# Patient Record
Sex: FEMALE | Race: WHITE | Employment: OTHER | URBAN - METROPOLITAN AREA
[De-identification: names, ages, dates, MRNs, and addresses within clinical notes are randomized per-mention and may not be internally consistent; named-entity substitution may affect disease eponyms.]

---

## 2017-04-05 DIAGNOSIS — Z00.00 WELL ADULT EXAM: Primary | ICD-10-CM

## 2017-04-07 ENCOUNTER — HOSPITAL ENCOUNTER (OUTPATIENT)
Facility: CLINIC | Age: 72
Discharge: HOME | End: 2017-04-07
Payer: MEDICARE

## 2017-04-07 DIAGNOSIS — Z00.00 WELL ADULT EXAM: ICD-10-CM

## 2017-04-07 LAB
ABSOLUTE EOS #: 0.2 K/UL (ref 0–0.4)
ABSOLUTE LYMPH #: 1.9 K/UL (ref 1–4.8)
ABSOLUTE MONO #: 0.5 K/UL (ref 0.1–1.2)
ALBUMIN SERPL-MCNC: 4.1 G/DL (ref 3.5–5.2)
ALBUMIN/GLOBULIN RATIO: 1.4 (ref 1–2.5)
ALP BLD-CCNC: 66 U/L (ref 35–104)
ALT SERPL-CCNC: 19 U/L (ref 5–33)
ANION GAP SERPL CALCULATED.3IONS-SCNC: 11 MMOL/L (ref 9–17)
AST SERPL-CCNC: 22 U/L
BASOPHILS # BLD: 1 % (ref 0–2)
BASOPHILS ABSOLUTE: 0 K/UL (ref 0–0.2)
BILIRUB SERPL-MCNC: 0.57 MG/DL (ref 0.3–1.2)
BUN BLDV-MCNC: 15 MG/DL (ref 8–23)
BUN/CREAT BLD: ABNORMAL (ref 9–20)
CALCIUM SERPL-MCNC: 8.9 MG/DL (ref 8.6–10.4)
CHLORIDE BLD-SCNC: 105 MMOL/L (ref 98–107)
CHOLESTEROL/HDL RATIO: 3.5
CHOLESTEROL: 284 MG/DL
CO2: 27 MMOL/L (ref 20–31)
CREAT SERPL-MCNC: 0.57 MG/DL (ref 0.5–0.9)
DIFFERENTIAL TYPE: NORMAL
EOSINOPHILS RELATIVE PERCENT: 3 % (ref 1–4)
GFR AFRICAN AMERICAN: >60 ML/MIN
GFR NON-AFRICAN AMERICAN: >60 ML/MIN
GFR SERPL CREATININE-BSD FRML MDRD: ABNORMAL ML/MIN/{1.73_M2}
GFR SERPL CREATININE-BSD FRML MDRD: ABNORMAL ML/MIN/{1.73_M2}
GLUCOSE BLD-MCNC: 102 MG/DL (ref 70–99)
HCT VFR BLD CALC: 37.9 % (ref 36–46)
HDLC SERPL-MCNC: 81 MG/DL
HEMOGLOBIN: 12.7 G/DL (ref 12–16)
LDL CHOLESTEROL: 185 MG/DL (ref 0–130)
LYMPHOCYTES # BLD: 29 % (ref 24–44)
MCH RBC QN AUTO: 29.1 PG (ref 26–34)
MCHC RBC AUTO-ENTMCNC: 33.7 G/DL (ref 31–37)
MCV RBC AUTO: 86.4 FL (ref 80–100)
MONOCYTES # BLD: 8 % (ref 2–11)
PDW BLD-RTO: 15 % (ref 12.5–15.4)
PLATELET # BLD: 225 K/UL (ref 140–450)
PLATELET ESTIMATE: NORMAL
PMV BLD AUTO: 8.9 FL (ref 6–12)
POTASSIUM SERPL-SCNC: 4.1 MMOL/L (ref 3.7–5.3)
RBC # BLD: 4.38 M/UL (ref 4–5.2)
RBC # BLD: NORMAL 10*6/UL
SEG NEUTROPHILS: 59 % (ref 36–66)
SEGMENTED NEUTROPHILS ABSOLUTE COUNT: 4 K/UL (ref 1.8–7.7)
SODIUM BLD-SCNC: 143 MMOL/L (ref 135–144)
TOTAL PROTEIN: 7.1 G/DL (ref 6.4–8.3)
TRIGL SERPL-MCNC: 91 MG/DL
TSH SERPL DL<=0.05 MIU/L-ACNC: 1.33 MIU/L (ref 0.3–5)
VLDLC SERPL CALC-MCNC: ABNORMAL MG/DL (ref 1–30)
WBC # BLD: 6.7 K/UL (ref 3.5–11)
WBC # BLD: NORMAL 10*3/UL

## 2017-04-07 PROCEDURE — 36415 COLL VENOUS BLD VENIPUNCTURE: CPT

## 2017-04-07 PROCEDURE — 80061 LIPID PANEL: CPT

## 2017-04-07 PROCEDURE — 85025 COMPLETE CBC W/AUTO DIFF WBC: CPT

## 2017-04-07 PROCEDURE — 84443 ASSAY THYROID STIM HORMONE: CPT

## 2017-04-07 PROCEDURE — 80053 COMPREHEN METABOLIC PANEL: CPT

## 2017-04-12 ENCOUNTER — OFFICE VISIT (OUTPATIENT)
Dept: INTERNAL MEDICINE | Age: 72
End: 2017-04-12
Payer: MEDICARE

## 2017-04-12 VITALS
HEIGHT: 63 IN | WEIGHT: 150 LBS | SYSTOLIC BLOOD PRESSURE: 124 MMHG | DIASTOLIC BLOOD PRESSURE: 75 MMHG | HEART RATE: 79 BPM | BODY MASS INDEX: 26.58 KG/M2 | OXYGEN SATURATION: 100 %

## 2017-04-12 DIAGNOSIS — M79.10 MYALGIA: Primary | ICD-10-CM

## 2017-04-12 PROCEDURE — 99213 OFFICE O/P EST LOW 20 MIN: CPT | Performed by: INTERNAL MEDICINE

## 2017-04-12 RX ORDER — METHYLPREDNISOLONE 4 MG/1
TABLET ORAL
Qty: 1 KIT | Refills: 1 | Status: SHIPPED | OUTPATIENT
Start: 2017-04-12 | End: 2017-04-18

## 2017-04-12 RX ORDER — HYDROCODONE BITARTRATE AND ACETAMINOPHEN 5; 325 MG/1; MG/1
1 TABLET ORAL EVERY 8 HOURS PRN
Qty: 30 TABLET | Refills: 0 | Status: SHIPPED | OUTPATIENT
Start: 2017-04-12 | End: 2017-11-13 | Stop reason: SDUPTHER

## 2017-04-12 ASSESSMENT — PATIENT HEALTH QUESTIONNAIRE - PHQ9
SUM OF ALL RESPONSES TO PHQ9 QUESTIONS 1 & 2: 0
2. FEELING DOWN, DEPRESSED OR HOPELESS: 0
SUM OF ALL RESPONSES TO PHQ QUESTIONS 1-9: 0
1. LITTLE INTEREST OR PLEASURE IN DOING THINGS: 0

## 2017-06-06 ENCOUNTER — OFFICE VISIT (OUTPATIENT)
Dept: INTERNAL MEDICINE | Age: 72
End: 2017-06-06
Payer: MEDICARE

## 2017-06-06 VITALS
DIASTOLIC BLOOD PRESSURE: 74 MMHG | OXYGEN SATURATION: 99 % | WEIGHT: 147 LBS | HEIGHT: 63 IN | BODY MASS INDEX: 26.05 KG/M2 | HEART RATE: 97 BPM | SYSTOLIC BLOOD PRESSURE: 128 MMHG

## 2017-06-06 DIAGNOSIS — R05.9 PAIN AGGRAVATED BY COUGHING AND DEEP BREATHING: Primary | ICD-10-CM

## 2017-06-06 DIAGNOSIS — R07.81 RIB PAIN ON RIGHT SIDE: ICD-10-CM

## 2017-06-06 DIAGNOSIS — R52 PAIN AGGRAVATED BY COUGHING AND DEEP BREATHING: Primary | ICD-10-CM

## 2017-06-06 DIAGNOSIS — R07.9 CHEST PAIN, UNSPECIFIED TYPE: ICD-10-CM

## 2017-06-06 DIAGNOSIS — M79.10 MYALGIA: ICD-10-CM

## 2017-06-06 PROCEDURE — 93000 ELECTROCARDIOGRAM COMPLETE: CPT | Performed by: NURSE PRACTITIONER

## 2017-06-06 PROCEDURE — 99213 OFFICE O/P EST LOW 20 MIN: CPT | Performed by: NURSE PRACTITIONER

## 2017-06-06 RX ORDER — METHYLPREDNISOLONE 4 MG/1
TABLET ORAL
Qty: 1 KIT | Refills: 0 | Status: SHIPPED | OUTPATIENT
Start: 2017-06-06 | End: 2017-11-13

## 2017-09-19 ENCOUNTER — HOSPITAL ENCOUNTER (OUTPATIENT)
Age: 72
Discharge: HOME | End: 2017-09-19
Payer: MEDICARE

## 2017-10-10 ENCOUNTER — HOSPITAL ENCOUNTER (OUTPATIENT)
Age: 72
Discharge: HOME | End: 2017-10-10
Payer: MEDICARE

## 2017-11-13 ENCOUNTER — HOSPITAL ENCOUNTER (OUTPATIENT)
Age: 72
Discharge: HOME | End: 2017-11-13
Payer: MEDICARE

## 2017-11-13 ENCOUNTER — OFFICE VISIT (OUTPATIENT)
Dept: FAMILY MEDICINE CLINIC | Age: 72
End: 2017-11-13
Payer: MEDICARE

## 2017-11-13 ENCOUNTER — HOSPITAL ENCOUNTER (OUTPATIENT)
Dept: GENERAL RADIOLOGY | Facility: CLINIC | Age: 72
Discharge: HOME | End: 2017-11-13
Payer: MEDICARE

## 2017-11-13 VITALS
TEMPERATURE: 98 F | RESPIRATION RATE: 16 BRPM | WEIGHT: 152.8 LBS | SYSTOLIC BLOOD PRESSURE: 104 MMHG | HEIGHT: 63 IN | DIASTOLIC BLOOD PRESSURE: 56 MMHG | BODY MASS INDEX: 27.07 KG/M2 | HEART RATE: 80 BPM

## 2017-11-13 DIAGNOSIS — S90.32XA CONTUSION OF LEFT FOOT, INITIAL ENCOUNTER: ICD-10-CM

## 2017-11-13 DIAGNOSIS — M79.7 FIBROMYALGIA: Primary | ICD-10-CM

## 2017-11-13 DIAGNOSIS — Z85.3 HISTORY OF BREAST CANCER: ICD-10-CM

## 2017-11-13 DIAGNOSIS — M79.10 MYALGIA: ICD-10-CM

## 2017-11-13 DIAGNOSIS — E83.51 HYPOCALCEMIA: ICD-10-CM

## 2017-11-13 PROCEDURE — 99213 OFFICE O/P EST LOW 20 MIN: CPT | Performed by: INTERNAL MEDICINE

## 2017-11-13 PROCEDURE — 73620 X-RAY EXAM OF FOOT: CPT

## 2017-11-13 RX ORDER — HYDROCODONE BITARTRATE AND ACETAMINOPHEN 5; 325 MG/1; MG/1
1 TABLET ORAL EVERY 8 HOURS PRN
Qty: 30 TABLET | Refills: 0 | Status: SHIPPED | OUTPATIENT
Start: 2017-11-13 | End: 2017-11-20

## 2017-11-13 ASSESSMENT — ENCOUNTER SYMPTOMS
GASTROINTESTINAL NEGATIVE: 1
RESPIRATORY NEGATIVE: 1
EYES NEGATIVE: 1
ALLERGIC/IMMUNOLOGIC NEGATIVE: 1

## 2017-11-14 ENCOUNTER — TELEPHONE (OUTPATIENT)
Dept: FAMILY MEDICINE CLINIC | Age: 72
End: 2017-11-14

## 2017-11-14 NOTE — TELEPHONE ENCOUNTER
Patient called in requesting results of Xray. Please advise thank you! Next Visit Date:    Last ov 11/13/17    No future appointments.     Health Maintenance   Topic Date Due    Flu vaccine (1) 09/01/2017    Zostavax vaccine  01/18/2018 (Originally 11/7/2005)    DEXA (modify frequency per FRAX score)  01/18/2018 (Originally 11/7/2010)    DTaP/Tdap/Td vaccine (1 - Tdap) 01/18/2018 (Originally 4/28/2016)    Pneumococcal low/med risk (1 of 2 - PCV13) 01/18/2018 (Originally 11/7/2010)    Hepatitis C screen  01/18/2018 (Originally 1945)    Colon cancer screen colonoscopy  01/18/2018 (Originally 11/7/1995)    Lipid screen  04/07/2022       No results found for: LABA1C          ( goal A1C is < 7)   No results found for: LABMICR  LDL Cholesterol (mg/dL)   Date Value   04/07/2017 185 (H)       (goal LDL is <100)   AST (U/L)   Date Value   04/07/2017 22     ALT (U/L)   Date Value   04/07/2017 19     BUN (mg/dL)   Date Value   04/07/2017 15     BP Readings from Last 3 Encounters:   11/13/17 (!) 104/56   06/06/17 128/74   04/12/17 124/75          (goal 120/80)    All Future Testing planned in [facility]  Lab Frequency Next Occurrence   ENRIQUE Digital Screen Bilateral Once 11/21/2017   Magnesium Once 11/23/2016               Patient Active Problem List:     Myalgia     Constipation     Malaise     History of breast cancer     Hypocalcemia

## 2018-03-07 ENCOUNTER — HOSPITAL ENCOUNTER (OUTPATIENT)
Dept: GENERAL RADIOLOGY | Facility: CLINIC | Age: 73
Discharge: HOME | End: 2018-03-09
Payer: MEDICARE

## 2018-03-07 DIAGNOSIS — M25.551 RIGHT HIP PAIN: ICD-10-CM

## 2018-03-07 PROCEDURE — 73502 X-RAY EXAM HIP UNI 2-3 VIEWS: CPT

## 2018-06-11 ENCOUNTER — HOSPITAL ENCOUNTER (OUTPATIENT)
Facility: CLINIC | Age: 73
Discharge: HOME | End: 2018-06-11
Payer: MEDICARE

## 2018-06-11 DIAGNOSIS — Z11.59 ENCOUNTER FOR HEPATITIS C SCREENING TEST FOR LOW RISK PATIENT: ICD-10-CM

## 2018-06-11 DIAGNOSIS — Z13.1 SCREENING FOR DIABETES MELLITUS: ICD-10-CM

## 2018-06-11 DIAGNOSIS — Z13.220 SCREENING FOR LIPID DISORDERS: ICD-10-CM

## 2018-06-11 LAB
CHOLESTEROL/HDL RATIO: 3.1
CHOLESTEROL: 243 MG/DL
GLUCOSE FASTING: 91 MG/DL (ref 70–99)
HDLC SERPL-MCNC: 78 MG/DL
HEPATITIS C ANTIBODY: NONREACTIVE
LDL CHOLESTEROL: 149 MG/DL (ref 0–130)
TRIGL SERPL-MCNC: 82 MG/DL
VLDLC SERPL CALC-MCNC: ABNORMAL MG/DL (ref 1–30)

## 2018-06-11 PROCEDURE — 36415 COLL VENOUS BLD VENIPUNCTURE: CPT

## 2018-06-11 PROCEDURE — 80061 LIPID PANEL: CPT

## 2018-06-11 PROCEDURE — 86803 HEPATITIS C AB TEST: CPT

## 2018-06-11 PROCEDURE — 82947 ASSAY GLUCOSE BLOOD QUANT: CPT

## 2018-09-14 ENCOUNTER — HOSPITAL ENCOUNTER (OUTPATIENT)
Age: 73
Setting detail: SPECIMEN
Discharge: HOME | End: 2018-09-14
Payer: MEDICARE

## 2018-09-14 DIAGNOSIS — L65.9 HAIR LOSS: ICD-10-CM

## 2018-09-14 LAB
ABSOLUTE EOS #: 0.17 K/UL (ref 0–0.44)
ABSOLUTE IMMATURE GRANULOCYTE: <0.03 K/UL (ref 0–0.3)
ABSOLUTE LYMPH #: 1.58 K/UL (ref 1.1–3.7)
ABSOLUTE MONO #: 0.43 K/UL (ref 0.1–1.2)
BASOPHILS # BLD: 1 % (ref 0–2)
BASOPHILS ABSOLUTE: 0.04 K/UL (ref 0–0.2)
DIFFERENTIAL TYPE: NORMAL
EOSINOPHILS RELATIVE PERCENT: 3 % (ref 1–4)
HCT VFR BLD CALC: 41.8 % (ref 36.3–47.1)
HEMOGLOBIN: 13.3 G/DL (ref 11.9–15.1)
IMMATURE GRANULOCYTES: 0 %
LYMPHOCYTES # BLD: 28 % (ref 24–43)
MCH RBC QN AUTO: 30.2 PG (ref 25.2–33.5)
MCHC RBC AUTO-ENTMCNC: 31.8 G/DL (ref 28.4–34.8)
MCV RBC AUTO: 95 FL (ref 82.6–102.9)
MONOCYTES # BLD: 8 % (ref 3–12)
NRBC AUTOMATED: 0 PER 100 WBC
PDW BLD-RTO: 13.5 % (ref 11.8–14.4)
PLATELET # BLD: 215 K/UL (ref 138–453)
PLATELET ESTIMATE: NORMAL
PMV BLD AUTO: 11.6 FL (ref 8.1–13.5)
RBC # BLD: 4.4 M/UL (ref 3.95–5.11)
RBC # BLD: NORMAL 10*6/UL
SEG NEUTROPHILS: 60 % (ref 36–65)
SEGMENTED NEUTROPHILS ABSOLUTE COUNT: 3.4 K/UL (ref 1.5–8.1)
TSH SERPL DL<=0.05 MIU/L-ACNC: 0.76 MIU/L (ref 0.3–5)
VITAMIN B-12: 274 PG/ML (ref 232–1245)
WBC # BLD: 5.6 K/UL (ref 3.5–11.3)
WBC # BLD: NORMAL 10*3/UL

## 2019-03-24 ENCOUNTER — APPOINTMENT (OUTPATIENT)
Dept: CT IMAGING | Facility: CLINIC | Age: 74
End: 2019-03-24
Payer: MEDICARE

## 2019-03-24 ENCOUNTER — HOSPITAL ENCOUNTER (EMERGENCY)
Facility: CLINIC | Age: 74
Discharge: HOME | End: 2019-03-24
Attending: EMERGENCY MEDICINE
Payer: MEDICARE

## 2019-03-24 VITALS
OXYGEN SATURATION: 99 % | SYSTOLIC BLOOD PRESSURE: 126 MMHG | BODY MASS INDEX: 23.22 KG/M2 | DIASTOLIC BLOOD PRESSURE: 75 MMHG | HEIGHT: 64 IN | HEART RATE: 79 BPM | TEMPERATURE: 98 F | RESPIRATION RATE: 14 BRPM | WEIGHT: 136 LBS

## 2019-03-24 DIAGNOSIS — N39.0 URINARY TRACT INFECTION WITH HEMATURIA, SITE UNSPECIFIED: ICD-10-CM

## 2019-03-24 DIAGNOSIS — R31.9 URINARY TRACT INFECTION WITH HEMATURIA, SITE UNSPECIFIED: ICD-10-CM

## 2019-03-24 DIAGNOSIS — N20.1 LEFT URETERAL STONE: Primary | ICD-10-CM

## 2019-03-24 LAB
-: ABNORMAL
ABSOLUTE EOS #: 0.1 K/UL (ref 0–0.4)
ABSOLUTE IMMATURE GRANULOCYTE: ABNORMAL K/UL (ref 0–0.3)
ABSOLUTE LYMPH #: 1.9 K/UL (ref 1–4.8)
ABSOLUTE MONO #: 0.5 K/UL (ref 0.1–1.2)
ALBUMIN SERPL-MCNC: 4.4 G/DL (ref 3.5–5.2)
ALBUMIN/GLOBULIN RATIO: 1.4 (ref 1–2.5)
ALP BLD-CCNC: 71 U/L (ref 35–104)
ALT SERPL-CCNC: 10 U/L (ref 5–33)
AMORPHOUS: ABNORMAL
ANION GAP SERPL CALCULATED.3IONS-SCNC: 15 MMOL/L (ref 9–17)
AST SERPL-CCNC: 20 U/L
BACTERIA: ABNORMAL
BASOPHILS # BLD: 0 % (ref 0–2)
BASOPHILS ABSOLUTE: 0 K/UL (ref 0–0.2)
BILIRUB SERPL-MCNC: 0.5 MG/DL (ref 0.3–1.2)
BILIRUBIN URINE: NEGATIVE
BUN BLDV-MCNC: 22 MG/DL (ref 8–23)
BUN/CREAT BLD: ABNORMAL (ref 9–20)
CALCIUM SERPL-MCNC: 9.9 MG/DL (ref 8.6–10.4)
CASTS UA: ABNORMAL /LPF (ref 0–2)
CHLORIDE BLD-SCNC: 103 MMOL/L (ref 98–107)
CO2: 24 MMOL/L (ref 20–31)
COLOR: YELLOW
COMMENT UA: ABNORMAL
CREAT SERPL-MCNC: 0.9 MG/DL (ref 0.5–0.9)
CRYSTALS, UA: ABNORMAL /HPF
DIFFERENTIAL TYPE: ABNORMAL
EOSINOPHILS RELATIVE PERCENT: 2 % (ref 1–4)
EPITHELIAL CELLS UA: ABNORMAL /HPF (ref 0–5)
GFR AFRICAN AMERICAN: >60 ML/MIN
GFR NON-AFRICAN AMERICAN: >60 ML/MIN
GFR SERPL CREATININE-BSD FRML MDRD: ABNORMAL ML/MIN/{1.73_M2}
GFR SERPL CREATININE-BSD FRML MDRD: ABNORMAL ML/MIN/{1.73_M2}
GLUCOSE BLD-MCNC: 101 MG/DL (ref 70–99)
GLUCOSE URINE: NEGATIVE
HCT VFR BLD CALC: 43 % (ref 36–46)
HEMOGLOBIN: 14.2 G/DL (ref 12–16)
IMMATURE GRANULOCYTES: ABNORMAL %
KETONES, URINE: ABNORMAL
LEUKOCYTE ESTERASE, URINE: ABNORMAL
LYMPHOCYTES # BLD: 20 % (ref 24–44)
MCH RBC QN AUTO: 30.3 PG (ref 26–34)
MCHC RBC AUTO-ENTMCNC: 33 G/DL (ref 31–37)
MCV RBC AUTO: 91.8 FL (ref 80–100)
MONOCYTES # BLD: 6 % (ref 2–11)
MUCUS: ABNORMAL
NITRITE, URINE: NEGATIVE
NRBC AUTOMATED: ABNORMAL PER 100 WBC
OTHER OBSERVATIONS UA: ABNORMAL
PDW BLD-RTO: 14.5 % (ref 12.5–15.4)
PH UA: 5.5 (ref 5–8)
PLATELET # BLD: 207 K/UL (ref 140–450)
PLATELET ESTIMATE: ABNORMAL
PMV BLD AUTO: 8.7 FL (ref 6–12)
POTASSIUM SERPL-SCNC: 4.1 MMOL/L (ref 3.7–5.3)
PROTEIN UA: NEGATIVE
RBC # BLD: 4.69 M/UL (ref 4–5.2)
RBC # BLD: ABNORMAL 10*6/UL
RBC UA: ABNORMAL /HPF (ref 0–2)
RENAL EPITHELIAL, UA: ABNORMAL /HPF
SEG NEUTROPHILS: 72 % (ref 36–66)
SEGMENTED NEUTROPHILS ABSOLUTE COUNT: 6.8 K/UL (ref 1.8–7.7)
SODIUM BLD-SCNC: 142 MMOL/L (ref 135–144)
SPECIFIC GRAVITY UA: 1.02 (ref 1–1.03)
TOTAL PROTEIN: 7.5 G/DL (ref 6.4–8.3)
TRICHOMONAS: ABNORMAL
TURBIDITY: ABNORMAL
URINE HGB: ABNORMAL
UROBILINOGEN, URINE: NORMAL
WBC # BLD: 9.4 K/UL (ref 3.5–11)
WBC # BLD: ABNORMAL 10*3/UL
WBC UA: ABNORMAL /HPF (ref 0–5)
YEAST: ABNORMAL

## 2019-03-24 PROCEDURE — 87086 URINE CULTURE/COLONY COUNT: CPT

## 2019-03-24 PROCEDURE — 96375 TX/PRO/DX INJ NEW DRUG ADDON: CPT

## 2019-03-24 PROCEDURE — 36415 COLL VENOUS BLD VENIPUNCTURE: CPT

## 2019-03-24 PROCEDURE — 80053 COMPREHEN METABOLIC PANEL: CPT

## 2019-03-24 PROCEDURE — 74176 CT ABD & PELVIS W/O CONTRAST: CPT

## 2019-03-24 PROCEDURE — 6360000002 HC RX W HCPCS: Performed by: EMERGENCY MEDICINE

## 2019-03-24 PROCEDURE — 81001 URINALYSIS AUTO W/SCOPE: CPT

## 2019-03-24 PROCEDURE — 2580000003 HC RX 258: Performed by: EMERGENCY MEDICINE

## 2019-03-24 PROCEDURE — 96374 THER/PROPH/DIAG INJ IV PUSH: CPT

## 2019-03-24 PROCEDURE — 85025 COMPLETE CBC W/AUTO DIFF WBC: CPT

## 2019-03-24 PROCEDURE — 99284 EMERGENCY DEPT VISIT MOD MDM: CPT

## 2019-03-24 RX ORDER — CEPHALEXIN 500 MG/1
500 CAPSULE ORAL 4 TIMES DAILY
Qty: 28 CAPSULE | Refills: 0 | Status: SHIPPED | OUTPATIENT
Start: 2019-03-24 | End: 2019-03-31

## 2019-03-24 RX ORDER — KETOROLAC TROMETHAMINE 15 MG/ML
15 INJECTION, SOLUTION INTRAMUSCULAR; INTRAVENOUS ONCE
Status: COMPLETED | OUTPATIENT
Start: 2019-03-24 | End: 2019-03-24

## 2019-03-24 RX ORDER — IBUPROFEN 600 MG/1
600 TABLET ORAL EVERY 6 HOURS PRN
Qty: 30 TABLET | Refills: 0 | Status: SHIPPED | OUTPATIENT
Start: 2019-03-24

## 2019-03-24 RX ORDER — ONDANSETRON 4 MG/1
4 TABLET, ORALLY DISINTEGRATING ORAL EVERY 8 HOURS PRN
Qty: 20 TABLET | Refills: 0 | Status: SHIPPED | OUTPATIENT
Start: 2019-03-24 | End: 2020-09-04

## 2019-03-24 RX ORDER — ONDANSETRON 2 MG/ML
4 INJECTION INTRAMUSCULAR; INTRAVENOUS ONCE
Status: COMPLETED | OUTPATIENT
Start: 2019-03-24 | End: 2019-03-24

## 2019-03-24 RX ORDER — HYDROCODONE BITARTRATE AND ACETAMINOPHEN 5; 325 MG/1; MG/1
1 TABLET ORAL EVERY 6 HOURS PRN
Qty: 20 TABLET | Refills: 0 | Status: SHIPPED | OUTPATIENT
Start: 2019-03-24 | End: 2021-06-02 | Stop reason: SDUPTHER

## 2019-03-24 RX ORDER — 0.9 % SODIUM CHLORIDE 0.9 %
500 INTRAVENOUS SOLUTION INTRAVENOUS ONCE
Status: COMPLETED | OUTPATIENT
Start: 2019-03-24 | End: 2019-03-24

## 2019-03-24 RX ADMIN — ONDANSETRON 4 MG: 2 INJECTION INTRAMUSCULAR; INTRAVENOUS at 16:02

## 2019-03-24 RX ADMIN — SODIUM CHLORIDE 500 ML: 9 INJECTION, SOLUTION INTRAVENOUS at 16:02

## 2019-03-24 RX ADMIN — KETOROLAC TROMETHAMINE 15 MG: 15 INJECTION, SOLUTION INTRAMUSCULAR; INTRAVENOUS at 16:06

## 2019-03-24 ASSESSMENT — PAIN SCALES - GENERAL
PAINLEVEL_OUTOF10: 10
PAINLEVEL_OUTOF10: 10
PAINLEVEL_OUTOF10: 3
PAINLEVEL_OUTOF10: 7
PAINLEVEL_OUTOF10: 2

## 2019-03-24 ASSESSMENT — PAIN DESCRIPTION - FREQUENCY: FREQUENCY: INTERMITTENT

## 2019-03-24 ASSESSMENT — PAIN DESCRIPTION - DESCRIPTORS: DESCRIPTORS: SHARP

## 2019-03-24 ASSESSMENT — PAIN DESCRIPTION - ORIENTATION
ORIENTATION: LEFT;LOWER
ORIENTATION: LEFT
ORIENTATION: LEFT;LOWER

## 2019-03-24 ASSESSMENT — PAIN DESCRIPTION - PAIN TYPE
TYPE: ACUTE PAIN

## 2019-03-24 ASSESSMENT — PAIN DESCRIPTION - LOCATION
LOCATION: ABDOMEN
LOCATION: ABDOMEN;PELVIS
LOCATION: ABDOMEN;PELVIS

## 2019-03-24 ASSESSMENT — PAIN - FUNCTIONAL ASSESSMENT: PAIN_FUNCTIONAL_ASSESSMENT: 0-10

## 2019-03-25 LAB
CULTURE: NORMAL
Lab: NORMAL
SPECIMEN DESCRIPTION: NORMAL

## 2020-08-28 ENCOUNTER — HOSPITAL ENCOUNTER (OUTPATIENT)
Age: 75
Setting detail: SPECIMEN
Discharge: HOME | End: 2020-08-28
Payer: MEDICARE

## 2020-08-28 LAB
ALBUMIN SERPL-MCNC: 4.3 G/DL (ref 3.5–5.2)
ALBUMIN/GLOBULIN RATIO: 1.5 (ref 1–2.5)
ALP BLD-CCNC: 60 U/L (ref 35–104)
ALT SERPL-CCNC: 12 U/L (ref 5–33)
ANION GAP SERPL CALCULATED.3IONS-SCNC: 14 MMOL/L (ref 9–17)
AST SERPL-CCNC: 22 U/L
BILIRUB SERPL-MCNC: 0.51 MG/DL (ref 0.3–1.2)
BUN BLDV-MCNC: 26 MG/DL (ref 8–23)
BUN/CREAT BLD: ABNORMAL (ref 9–20)
CALCIUM SERPL-MCNC: 9.8 MG/DL (ref 8.6–10.4)
CHLORIDE BLD-SCNC: 103 MMOL/L (ref 98–107)
CHOLESTEROL/HDL RATIO: 3.8
CHOLESTEROL: 252 MG/DL
CO2: 23 MMOL/L (ref 20–31)
CREAT SERPL-MCNC: 0.78 MG/DL (ref 0.5–0.9)
GFR AFRICAN AMERICAN: >60 ML/MIN
GFR NON-AFRICAN AMERICAN: >60 ML/MIN
GFR SERPL CREATININE-BSD FRML MDRD: ABNORMAL ML/MIN/{1.73_M2}
GFR SERPL CREATININE-BSD FRML MDRD: ABNORMAL ML/MIN/{1.73_M2}
GLUCOSE FASTING: 92 MG/DL (ref 70–99)
HDLC SERPL-MCNC: 67 MG/DL
LDL CHOLESTEROL: 165 MG/DL (ref 0–130)
POTASSIUM SERPL-SCNC: 4.3 MMOL/L (ref 3.7–5.3)
SODIUM BLD-SCNC: 140 MMOL/L (ref 135–144)
TOTAL PROTEIN: 7.1 G/DL (ref 6.4–8.3)
TRIGL SERPL-MCNC: 100 MG/DL
VLDLC SERPL CALC-MCNC: ABNORMAL MG/DL (ref 1–30)

## 2020-10-08 PROBLEM — N20.2 CALCULUS OF KIDNEY AND URETER: Status: ACTIVE | Noted: 2019-03-27

## 2021-10-03 ENCOUNTER — HOSPITAL ENCOUNTER (EMERGENCY)
Facility: CLINIC | Age: 76
Discharge: HOME | End: 2021-10-03
Attending: EMERGENCY MEDICINE
Payer: MEDICARE

## 2021-10-03 VITALS
OXYGEN SATURATION: 98 % | SYSTOLIC BLOOD PRESSURE: 147 MMHG | DIASTOLIC BLOOD PRESSURE: 70 MMHG | HEART RATE: 66 BPM | RESPIRATION RATE: 18 BRPM

## 2021-10-03 DIAGNOSIS — M54.32 SCIATICA OF LEFT SIDE: Primary | ICD-10-CM

## 2021-10-03 PROCEDURE — 99282 EMERGENCY DEPT VISIT SF MDM: CPT

## 2021-10-03 RX ORDER — CYCLOBENZAPRINE HCL 5 MG
5 TABLET ORAL 2 TIMES DAILY PRN
Qty: 10 TABLET | Refills: 0 | Status: SHIPPED | OUTPATIENT
Start: 2021-10-03 | End: 2021-10-08 | Stop reason: SDUPTHER

## 2021-10-03 RX ORDER — TRAMADOL HYDROCHLORIDE 50 MG/1
50 TABLET ORAL EVERY 4 HOURS PRN
Qty: 30 TABLET | Refills: 0 | Status: SHIPPED | OUTPATIENT
Start: 2021-10-03 | End: 2021-10-08

## 2021-10-03 ASSESSMENT — PAIN SCALES - GENERAL: PAINLEVEL_OUTOF10: 7

## 2021-10-03 NOTE — ED PROVIDER NOTES
[facility]  Phone: [address]      Pt Name: Claudeen Ares  MRN: [id number]  [place] 1945  Date of evaluation: 10/3/2021    CHIEF COMPLAINT       Chief Complaint   Patient presents with    Back Pain     lower back pain/spasms, tried norco at home with no relief, pt notes she cannot have steriods per PCP       HISTORY OF Abbie is a 76 y.o. female who presents shooting pain in her buttocks. Patient states that she has never had this pain before she states that her back is a concern for her and has been a problem in addition to her hips bilaterally. She denies any trauma. She is notes no incontinence. She noted no rash. She states that she did have a area where she was using a heating pad believes she has some areas of slight blistering noted in the buttock region on the left side    REVIEW OF SYSTEMS     Constitutional: No fevers or chills   HEENT: No sore throat, rhinorrhea, or earache   Eyes: No blurry vision or double vision no drainage   Cardiovascular: No chest pain or tachycardia   Respiratory: No wheezing or shortness of breath no cough   Gastrointestinal: No nausea, vomiting, diarrhea, constipation, or abdominal pain   : No hematuria or dysuria   Musculoskeletal: No swelling or pain   Skin: No rash   Neurological: No focal neurologic complaints, weakness, or headache   see above  PAST MEDICAL HISTORY    has a past medical history of Calculus of kidney and ureter, Cancer of breast ([facility] [address] Fibromyalgia, and GERD (gastroesophageal reflux disease). SURGICAL HISTORY      has a past surgical history that includes Mastectomy (Bilateral, 11/2011); Cholecystectomy; [place] tooth extraction; Appendectomy; Tonsillectomy; Cataract removal; china and bso (cervix removed); cystourethroscopy (N/A, 05/08/2019); and Ureter stent placement (Left, 05/09/2019).     CURRENT MEDICATIONS       Previous Medications IBUPROFEN (ADVIL;MOTRIN) 600 MG TABLET    Take 1 tablet by mouth every 6 hours as needed for Pain       ALLERGIES     is allergic to other, percodan [oxycodone-aspirin], and aspirin. FAMILY HISTORY     has no family status information on file. family history is not on file. SOCIAL HISTORY      reports that she quit smoking about 53 years ago. Her smoking use included cigarettes. She started smoking about 61 years ago. She has a 4.00 pack-year smoking history. She has never used smokeless tobacco. She reports current alcohol use. She reports that she does not use drugs. PHYSICAL EXAM       ED Triage Vitals [10/03/21 1203]   BP Temp Temp src Pulse Resp SpO2 Height Weight   (!) 147/70 -- -- 66 18 98 % -- --     Constitutional: Alert, oriented x3, nontoxic, answering questions appropriately, acting properly for age, in no acute distress   HEENT: Extraocular muscles intact, mucous membranes moist.  Neck: Trachea midline, supple without lymphadenopathy, no posterior midline neck tenderness to palpation   Musculoskeletal: No extremity pain or swelling exam of the buttock area reveals some tenderness on palpation of overlying the sciatic nerve distribution and in the piriformis region. There is pain with forward flexion and slight pain with extension. There is noted to be a area of erythema with some blistering but I believe this is secondary to the heating pad that she used. Neurologic: Moving all 4 extremities without difficulty   Skin: Warm and dry     DIFFERENTIAL DIAGNOSIS/ MDM:     Sciatica do not believe that the patient has shingles on her history. She is limited the amount of medications that she can use she states that she cannot take any anti-inflammatory medicines and is not wanting to take any steroids at this time.   Patient will get some Flexeril she has some Norco at home and I will prescribe her some Ultram she is to follow-up with her orthopedic doctor soon as possible if not improving. DIAGNOSTIC RESULTS     EKG: All EKG's are interpreted by the Emergency Department Physician who either signs or Co-signs this chart in the absence of a cardiologist.        Not indicated unless otherwise documented above    LABS:  No results found for this visit on 10/03/21. Not indicated unless otherwise documented above    RADIOLOGY:   I reviewed the radiologist interpretations:    No orders to display       Not indicated unless otherwise documented above    EMERGENCY DEPARTMENT COURSE:     The patient was given the following medications:  Orders Placed This Encounter   Medications    cyclobenzaprine (FLEXERIL) 5 MG tablet     Sig: Take 1 tablet by mouth 2 times daily as needed for Muscle spasms     Dispense:  10 tablet     Refill:  0    traMADol (ULTRAM) 50 MG tablet     Sig: Take 1 tablet by mouth every 4 hours as needed for Pain for up to 5 days. Intended supply: 5 days. Take lowest dose possible to manage pain     Dispense:  30 tablet     Refill:  0        Vitals:   -------------------------  BP (!) 147/70   Pulse 66   Resp 18   SpO2 98%         I have reviewed the disposition diagnosis with the patient and or their family/guardian. I have answered their questions and given discharge instructions. They voiced understanding of these instructions and did not have any furtherquestions or complaints. CRITICAL CARE:    None    CONSULTS:    None    PROCEDURES:    None      OARRS Report if indicated             FINAL IMPRESSION      1. Sciatica of left side          DISPOSITION/PLAN   DISPOSITION Decision To Discharge 10/03/2021 12:10:02 PM        CONDITION ON DISPOSITION: STABLE       PATIENT REFERRED TO:  No follow-up provider specified.     DISCHARGE MEDICATIONS:  New Prescriptions    CYCLOBENZAPRINE (FLEXERIL) 5 MG TABLET    Take 1 tablet by mouth 2 times daily as needed for Muscle spasms    TRAMADOL (ULTRAM) 50 MG TABLET    Take 1 tablet by mouth every 4 hours as needed for Pain for up to 5 days. Intended supply: 5 days.  Take lowest dose possible to manage pain       (Please note that portions of thisnote were completed with a voice recognition program.  Efforts were made to edit the dictations but occasionally words are mis-transcribed.)    Jluio Dueñas MD,, MD  Attending Emergency Physician        Julio Dueñas MD  10/03/21 [id number]

## 2022-07-31 ENCOUNTER — APPOINTMENT (OUTPATIENT)
Dept: GENERAL RADIOLOGY | Facility: CLINIC | Age: 77
End: 2022-07-31
Payer: MEDICARE

## 2022-07-31 ENCOUNTER — HOSPITAL ENCOUNTER (EMERGENCY)
Facility: CLINIC | Age: 77
Discharge: HOME | End: 2022-07-31
Attending: EMERGENCY MEDICINE
Payer: MEDICARE

## 2022-07-31 VITALS
HEIGHT: 63 IN | SYSTOLIC BLOOD PRESSURE: 132 MMHG | OXYGEN SATURATION: 99 % | RESPIRATION RATE: 16 BRPM | BODY MASS INDEX: 24.8 KG/M2 | WEIGHT: 140 LBS | DIASTOLIC BLOOD PRESSURE: 58 MMHG | TEMPERATURE: 98.2 F | HEART RATE: 85 BPM

## 2022-07-31 DIAGNOSIS — S93.602A SPRAIN OF LEFT FOOT, INITIAL ENCOUNTER: Primary | ICD-10-CM

## 2022-07-31 PROCEDURE — 99283 EMERGENCY DEPT VISIT LOW MDM: CPT

## 2022-07-31 PROCEDURE — 73630 X-RAY EXAM OF FOOT: CPT

## 2022-07-31 RX ORDER — ACETAMINOPHEN 325 MG/1
650 TABLET ORAL EVERY 6 HOURS PRN
COMMUNITY

## 2022-07-31 ASSESSMENT — PAIN SCALES - GENERAL: PAINLEVEL_OUTOF10: 2

## 2022-07-31 ASSESSMENT — LIFESTYLE VARIABLES
HOW OFTEN DO YOU HAVE A DRINK CONTAINING ALCOHOL: MONTHLY OR LESS
HOW MANY STANDARD DRINKS CONTAINING ALCOHOL DO YOU HAVE ON A TYPICAL DAY: 1 OR 2

## 2022-07-31 ASSESSMENT — PAIN - FUNCTIONAL ASSESSMENT: PAIN_FUNCTIONAL_ASSESSMENT: 0-10

## 2022-07-31 NOTE — ED PROVIDER NOTES
[facility]  [address] [facility]  Phone: [address]      Pt Name: Teresa Harden  MRN: [id number]  [place] 1945  Date of evaluation: 7/31/2022    CHIEF COMPLAINT       Chief Complaint   Patient presents with    [address] is a 68 y.o. female who presents to the emergency [facility] complaining of left foot pain. Yesterday she was sitting doing some paperwork when she stood up her foot had fallen asleep and she fell the left side. She denies any other injuries no head injury or loss of consciousness no neck pain. She is only complaining of left foot pain worse with movement. She denies any other complaints no paresthesias or weakness. She rates her pain 2 out of 10. Does not want anything for pain at this time. REVIEW OF SYSTEMS       Constitutional: No fevers or chills   Musculoskeletal: Positive left foot pain  Skin: No rash left lower extremity  Neurological: No paresthesias or weakness left lower extremity    PAST MEDICAL HISTORY    has a past medical history of Calculus of kidney and ureter, Cancer of breast ([address] Fibromyalgia, and GERD (gastroesophageal reflux disease). SURGICAL HISTORY      has a past surgical history that includes Mastectomy (Bilateral, 11/2011); Cholecystectomy; [place] tooth extraction; Appendectomy; Tonsillectomy; Cataract removal; Total abdominal hysterectomy w/ bilateral salpingoophorectomy; cystourethroscopy (N/A, 05/08/2019); and Ureter stent placement (Left, 05/09/2019).     CURRENT MEDICATIONS       Previous Medications    ACETAMINOPHEN (TYLENOL) 325 MG TABLET    Take 650 mg by mouth every 6 hours as needed    IBUPROFEN (ADVIL;MOTRIN) 600 MG TABLET    Take 1 tablet by mouth every 6 hours as needed for Pain    MULTIPLE VITAMIN (MULTIVITAMIN ADULT PO)    Take 1 tablet by mouth daily       ALLERGIES     is allergic to other, percodan [oxycodone-aspirin], and aspirin. FAMILY HISTORY     has no family status information on file. family history is not on file. SOCIAL HISTORY      reports that she quit smoking about 54 years ago. Her smoking use included cigarettes. She started smoking about 62 years ago. She has a 4.00 pack-year smoking history. She has never used smokeless tobacco. She reports current alcohol use. She reports that she does not use drugs. PHYSICAL EXAM       ED Triage Vitals [07/31/22 1032]   BP Temp Temp src Heart Rate Resp SpO2 Height Weight   (!) 132/58 98.2 °F (36.8 °C) -- 85 16 99 % 5' 3\" (1.6 m) 140 lb (63.5 kg)     Constitutional: Alert, oriented x3, nontoxic, answering questions appropriately, acting properly for age, in no acute distress   HEENT: Extraocular muscles intact,   Neck: Trachea midline,    Musculoskeletal: Left lower extremity no pain at the knee chronic tender point secondary to fibromyalgia left fibular head. No pain at the ankle. Pedal pulse intact and capillary refill less than 2 seconds no pain at the fifth metatarsal head. There is tenderness to the dorsum of the foot over the fourth and third metatarsal without deformity. Neurologic: Left lower extremity motor and sensory intact. Skin: Warm and dry   DIFFERENTIAL DIAGNOSIS/ MDM:     X-ray left foot. DIAGNOSTIC RESULTS     EKG: All EKG's are interpreted by the Emergency Department Physician who either signs or Co-signs this chart in the absence of a cardiologist.        Not indicated unless otherwise documented above    LABS:  No results found for this visit on 07/31/22. Not indicated unless otherwise documented above    RADIOLOGY:   I reviewed the radiologist interpretations:    XR FOOT LEFT (MIN 3 VIEWS)   Final Result      No acute osseous abnormality of the left foot. Plantar calcaneal spur.              Not indicated unless otherwise documented above    EMERGENCY DEPARTMENT COURSE:     The patient was given the following medications:  No orders of mis-transcribed.)    Diogo Peralta,    Attending Emergency Physician        Diogo Peralta DO  07/31/22 1142

## 2022-07-31 NOTE — ED TRIAGE NOTES
Pt c/o  L foot pain, pt stood up from sitting and foot was asleep-fell onto L side rolling foot\", Pt denies LOC or hitting head

## 2022-07-31 NOTE — DISCHARGE INSTRUCTIONS
Return immediately if any worsening symptoms or any other concerns    Tell us how we did visit: [email or web address]   and let us know about your experience

## 2022-08-17 ENCOUNTER — HOSPITAL ENCOUNTER (EMERGENCY)
Facility: CLINIC | Age: 77
Discharge: HOME | End: 2022-08-17
Attending: EMERGENCY MEDICINE
Payer: MEDICARE

## 2022-08-17 ENCOUNTER — APPOINTMENT (OUTPATIENT)
Dept: CT IMAGING | Facility: CLINIC | Age: 77
End: 2022-08-17
Payer: MEDICARE

## 2022-08-17 VITALS
HEART RATE: 87 BPM | DIASTOLIC BLOOD PRESSURE: 65 MMHG | RESPIRATION RATE: 18 BRPM | OXYGEN SATURATION: 100 % | SYSTOLIC BLOOD PRESSURE: 130 MMHG | TEMPERATURE: 98.3 F

## 2022-08-17 DIAGNOSIS — G51.0 BELL'S PALSY: Primary | ICD-10-CM

## 2022-08-17 LAB
CHP ED QC CHECK: NORMAL
GLUCOSE BLD-MCNC: 150 MG/DL
GLUCOSE BLD-MCNC: 150 MG/DL (ref 65–105)

## 2022-08-17 PROCEDURE — 82947 ASSAY GLUCOSE BLOOD QUANT: CPT

## 2022-08-17 PROCEDURE — 99284 EMERGENCY DEPT VISIT MOD MDM: CPT

## 2022-08-17 PROCEDURE — 70450 CT HEAD/BRAIN W/O DYE: CPT

## 2022-08-17 RX ORDER — PREDNISONE 10 MG/1
10 TABLET ORAL SEE ADMIN INSTRUCTIONS
Qty: 17 TABLET | Refills: 0 | Status: SHIPPED | OUTPATIENT
Start: 2022-08-17 | End: 2022-08-23

## 2022-08-17 ASSESSMENT — PAIN - FUNCTIONAL ASSESSMENT: PAIN_FUNCTIONAL_ASSESSMENT: 0-10

## 2022-08-17 ASSESSMENT — PAIN DESCRIPTION - ORIENTATION: ORIENTATION: RIGHT

## 2022-08-17 ASSESSMENT — ENCOUNTER SYMPTOMS
SORE THROAT: 0
VOMITING: 0
SHORTNESS OF BREATH: 0
DIARRHEA: 0

## 2022-08-17 ASSESSMENT — PAIN DESCRIPTION - PAIN TYPE: TYPE: ACUTE PAIN

## 2022-08-17 NOTE — ED PROVIDER NOTES
[facility]  [facility] [place]  Phone: [facility]  EMERGENCY DEPARTMENT ENCOUNTER      Pt Name: Mai Schirmer  MRN: [id number]  [place] 1945  Date of evaluation: 8/17/2022  Provider: Clovis Locke, [address]       Chief Complaint   Patient presents with    Facial Droop     Right-side facial droop onset 0730 am     Otalgia     Pt is currently on 3rd day of Augmentin for Ear Infection          HISTORY OF PRESENT ILLNESS   (Location/Symptom, Timing/Onset,Context/Setting, Quality, Duration, Modifying Factors, Severity)  Note limiting factors. Mai Schirmer is a 68 y.o. female who presents to the emergency department for the evaluation of right-sided facial drooping. Patient states she noticed it this morning when she got up. Patient denies any difficulty speaking or swallowing. Patient denies any numbness, tingling or weakness in her arms or legs. Patient denies history of hypertension, diabetes, dyslipidemia or strokes. Patient complains of a right ear infection for which she is currently on antibiotics    Nursing Notes were reviewed. REVIEW OF SYSTEMS    (2-9systems for level 4, 10 or more for level 5)     Review of Systems   Constitutional:  Negative for fever. HENT:  Positive for ear pain. Negative for sore throat. Respiratory:  Negative for shortness of breath. Cardiovascular:  Negative for chest pain. Gastrointestinal:  Negative for diarrhea and vomiting. Genitourinary:  Negative for dysuria. Skin:  Negative for rash. Neurological:  Positive for facial asymmetry. Negative for speech difficulty and weakness. All other systems reviewed and are negative. Except asnoted above the remainder of the review of systems was reviewed and negative.        PAST MEDICAL HISTORY     Past Medical History:   Diagnosis Date    Calculus of kidney and ureter 3/27/2019    Cancer of breast [facility]) 2011    double mastectomy Fibromyalgia     GERD (gastroesophageal reflux disease)          SURGICAL HISTORY       Past Surgical History:   Procedure Laterality Date    APPENDECTOMY      CATARACT REMOVAL      CHOLECYSTECTOMY      CYSTOURETHROSCOPY N/A 2019    Left sided retrograde pyelogram    MASTECTOMY Bilateral 2011    DENYS AND BSO (CERVIX REMOVED)      TONSILLECTOMY      URETER STENT PLACEMENT Left 2019    WISDOM TOOTH EXTRACTION           CURRENT MEDICATIONS     Previous Medications    ACETAMINOPHEN (TYLENOL) 325 MG TABLET    Take 650 mg by mouth every 6 hours as needed    AMOXICILLIN-CLAVULANATE (AUGMENTIN) 875-125 MG PER TABLET    Take 1 tablet by mouth in the morning and 1 tablet before bedtime. Do all this for 10 days. IBUPROFEN (ADVIL;MOTRIN) 600 MG TABLET    Take 1 tablet by mouth every 6 hours as needed for Pain    MULTIPLE VITAMIN (MULTIVITAMIN ADULT PO)    Take 1 tablet by mouth daily       ALLERGIES     Other, Percodan [oxycodone-aspirin], and Aspirin    FAMILY HISTORY     No family history on file. SOCIAL HISTORY       Social History     Socioeconomic History    Marital status:     Tobacco Use    Smoking status: Former     Packs/day: 0.50     Years: 8.00     Pack years: 4.00     Types: Cigarettes     Start date: 1960     Quit date: 1968     Years since quittin.5    Smokeless tobacco: Never   Vaping Use    Vaping Use: Never used   Substance and Sexual Activity    Alcohol use: Yes     Comment: very little    Drug use: No     Social Determinants of Health     Financial Resource Strain: Low Risk     Difficulty of Paying Living Expenses: Not hard at all   Food Insecurity: No Food Insecurity    Worried About [address] [place] [address] [employer] in the Last Year: Never true    Ran Out of Food in the Last Year: Never true       SCREENINGS             PHYSICAL EXAM    (up to 7 for level 4, 8 or more for level 5)     ED Triage Vitals   BP Temp Temp src Heart Rate Resp SpO2 Height Weight   22 1223 -- -- 08/17/22 1221 08/17/22 1221 08/17/22 1221 -- --   (!) 141/68   99 11 98 %         Physical Exam  Vitals and nursing note reviewed. Constitutional:       General: She is not in acute distress. Appearance: Normal appearance. She is not ill-appearing or toxic-appearing. HENT:      Head: Normocephalic and atraumatic. Nose: Nose normal. No congestion. Mouth/Throat:      Mouth: Mucous membranes are moist.   Eyes:      General:         Right eye: No discharge. Left eye: No discharge. Conjunctiva/sclera: Conjunctivae normal.   Cardiovascular:      Rate and Rhythm: Normal rate and regular rhythm. Pulses: Normal pulses. Heart sounds: Normal heart sounds. No murmur heard. Pulmonary:      Effort: Pulmonary effort is normal. No respiratory distress. Breath sounds: Normal breath sounds. No wheezing. Abdominal:      General: Abdomen is flat. There is no distension. Palpations: Abdomen is soft. Tenderness: no abdominal tenderness   Musculoskeletal:         General: No deformity or signs of injury. Normal range of motion. Cervical back: Normal range of motion. Skin:     General: Skin is warm and dry. Capillary Refill: Capillary refill takes less than 2 seconds. Findings: No rash. Neurological:      Mental Status: She is alert and oriented to person, place, and time. Mental status is at baseline. Cranial Nerves: Cranial nerve deficit present. Motor: No weakness. Comments: Speaking normally. No facial asymmetry. Moving all 4 extremities. Normal gait. No visual field deficits. Extraocular movements intact. There is no horizontal or vertical nystagmus. Patient has some difficulty raising the right eyebrows, keeping the right eyelid shut and has drooping of the right lip. Hearing intact. Uvula midline and no difficulty swallowing. Can rotate head side to side and shrug shoulders.  The patient has a normal finger to nose exam performed at bedside. She also reports that food tasted funny to her yesterday   Psychiatric:         Mood and Affect: Mood normal.       EMERGENCY DEPARTMENT COURSE and DIFFERENTIAL DIAGNOSIS/MDM:   Vitals:    Vitals:    08/17/22 1221 08/17/22 1223 08/17/22 1227   BP:  (!) 141/68    Pulse: 99 96    Resp: 11     Temp:   98.3 °F (36.8 °C)   SpO2: 98%         Patient presents to the emergency department with the complaint described above. Vital signs showed slight hypertension, otherwise unremarkable. Physical examination revealed focal neurologic deficit in conjunction with a likely 7th cranial nerve palsy. There were no other cranial nerve deficits or focal neurologic deficits on examination. Normal  strength in the arms and legs with normal gait. I strongly suspect that this is a Bell's palsy associated with a right otitis media, however, I am still obtaining CT of the head without contrast and I will reevaluate      DIAGNOSTIC RESULTS     LABS:  Labs Reviewed   POC GLUCOSE FINGERSTICK - Abnormal; Notable for the following components:       Result Value    POC Glucose 150 (*)     All other components within normal limits   POCT GLUCOSE - Normal       All other labs were within normal range or not returned as of this dictation. RADIOLOGY:  CT HEAD WO CONTRAST   Final Result   No acute intracranial abnormality. ED Course as of 08/17/22 1343   Wed Aug 17, 2022   1342 CT scan of the head showed no acute intracranial abnormality. As previously stated strongly suspect that this is Bell's palsy, I have discussed this with the patient as well as the low possibility that it is a developing stroke. Have talked about rapid PCP follow-up as well as what to return to ER for    At this time the patient is without objective evidence of an acute process requiring hospitalization or inpatient management. They have remained hemodynamically stable and are stable for discharge with outpatient follow-up.      Standard anticipatory guidance given to patient upon discharge. Have given them a specific time frame in which to follow-up and who to follow-up with. I have also advised them that they should return to the emergency department if they get worse, or not getting better or develop any new or concerning symptoms. Patient demonstrates understanding.   [TS]      ED Course User Index  [TS] Sam Corrales DO         PROCEDURES:  Unless otherwise noted below, none     Procedures    FINAL IMPRESSION      1. Bell's palsy          DISPOSITION/PLAN   DISPOSITION Decision To Discharge 08/17/2022 01:42:29 PM      PATIENT REFERRED TO:  Jacqualine Cockayne, DO  [address] [address]  [phone]    In 3 days      DISCHARGE MEDICATIONS:  New Prescriptions    PREDNISONE (DELTASONE) 10 MG TABLET    Take 1 tablet by mouth See Admin Instructions for 6 days Take 4 tablets by mouth daily for days 1-2. Take 3 tablets by mouth daily for days 3-4. Take 2 tablets by mouth daily day 5.  Take 1 tablet by mouth on day 6          (Please note that portions of this note were completed with a voice recognition program.  Efforts were made to edit the dictations but occasionally words are mis-transcribed.)    Sam Corrales DO,(electronically signed)  Board Certified Emergency Physician         Sam Corrales DO  08/17/22 [id number]

## 2023-10-19 ENCOUNTER — NURSE TRIAGE (OUTPATIENT)
Dept: OTHER | Facility: CLINIC | Age: 78
End: 2023-10-19

## 2023-10-19 NOTE — TELEPHONE ENCOUNTER
Location of patient: [place]    Received call from Yao at [facility]; Patient with Red Flag Complaint requesting to establish care with [facility]. Subjective: Caller states \"Rash\"     Current Symptoms:   Red, splotchy rash to bilateral ankles going up the shins  Intermittent itching  Swelling to ankles    Onset: 3 weeks ago; unchanged    Pain Severity: 0/10    Temperature: Denies    What has been tried: Cortisone cream    Recommended disposition: See PCP within 3 Days  Advised [facility] if no available appts. Care advice provided, patient verbalizes understanding; denies any other questions or concerns; instructed to call back for any new or worsening symptoms. Patient/Caller agrees with recommended disposition; writer provided warm transfer to [employer] at [facility] for appointment scheduling    Attention Provider: Thank you for allowing me to participate in the care of your patient. The patient was connected to triage in response to information provided to the E[facility]. Please do not respond through this encounter as the response is not directed to a shared pool.     Reason for Disposition   Localized rash present > 7 days    Protocols used: Rash or Redness - Localized-ADULT-

## 2023-10-28 SDOH — ECONOMIC STABILITY: FOOD INSECURITY: WITHIN THE PAST 12 MONTHS, THE FOOD YOU BOUGHT JUST DIDN'T LAST AND YOU DIDN'T HAVE MONEY TO GET MORE.: PATIENT DECLINED

## 2023-10-28 SDOH — ECONOMIC STABILITY: FOOD INSECURITY: WITHIN THE PAST 12 MONTHS, YOU WORRIED THAT YOUR FOOD WOULD RUN OUT BEFORE YOU GOT MONEY TO BUY MORE.: PATIENT DECLINED

## 2023-10-28 SDOH — ECONOMIC STABILITY: INCOME INSECURITY: HOW HARD IS IT FOR YOU TO PAY FOR THE VERY BASICS LIKE FOOD, HOUSING, MEDICAL CARE, AND HEATING?: PATIENT DECLINED

## 2023-10-28 SDOH — ECONOMIC STABILITY: HOUSING INSECURITY
IN THE LAST 12 MONTHS, WAS THERE A TIME WHEN YOU DID NOT HAVE A STEADY PLACE TO SLEEP OR SLEPT IN A SHELTER (INCLUDING NOW)?: PATIENT REFUSED

## 2023-10-28 SDOH — ECONOMIC STABILITY: TRANSPORTATION INSECURITY
IN THE PAST 12 MONTHS, HAS LACK OF TRANSPORTATION KEPT YOU FROM MEETINGS, WORK, OR FROM GETTING THINGS NEEDED FOR DAILY LIVING?: PATIENT DECLINED

## 2023-10-28 ASSESSMENT — PATIENT HEALTH QUESTIONNAIRE - PHQ9
SUM OF ALL RESPONSES TO PHQ9 QUESTIONS 1 & 2: 0
SUM OF ALL RESPONSES TO PHQ QUESTIONS 1-9: 0
1. LITTLE INTEREST OR PLEASURE IN DOING THINGS: NOT AT ALL
2. FEELING DOWN, DEPRESSED OR HOPELESS: 0
1. LITTLE INTEREST OR PLEASURE IN DOING THINGS: 0
SUM OF ALL RESPONSES TO PHQ9 QUESTIONS 1 & 2: 0
2. FEELING DOWN, DEPRESSED OR HOPELESS: NOT AT ALL
SUM OF ALL RESPONSES TO PHQ QUESTIONS 1-9: 0

## 2023-10-30 ENCOUNTER — OFFICE VISIT (OUTPATIENT)
Dept: PRIMARY CARE CLINIC | Age: 78
End: 2023-10-30
Payer: MEDICARE

## 2023-10-30 VITALS
OXYGEN SATURATION: 97 % | SYSTOLIC BLOOD PRESSURE: 128 MMHG | DIASTOLIC BLOOD PRESSURE: 80 MMHG | BODY MASS INDEX: 28.7 KG/M2 | WEIGHT: 162 LBS | HEART RATE: 88 BPM | HEIGHT: 63 IN

## 2023-10-30 DIAGNOSIS — G89.29 CHRONIC BILATERAL LOW BACK PAIN WITHOUT SCIATICA: ICD-10-CM

## 2023-10-30 DIAGNOSIS — M54.50 CHRONIC BILATERAL LOW BACK PAIN WITHOUT SCIATICA: ICD-10-CM

## 2023-10-30 DIAGNOSIS — M79.671 RIGHT FOOT PAIN: ICD-10-CM

## 2023-10-30 DIAGNOSIS — R21 RASH: Primary | ICD-10-CM

## 2023-10-30 PROCEDURE — 99213 OFFICE O/P EST LOW 20 MIN: CPT | Performed by: FAMILY MEDICINE

## 2023-10-30 PROCEDURE — 1123F ACP DISCUSS/DSCN MKR DOCD: CPT | Performed by: FAMILY MEDICINE

## 2023-10-30 RX ORDER — TRIAMCINOLONE ACETONIDE 1 MG/G
OINTMENT TOPICAL 2 TIMES DAILY
Qty: 30 G | Refills: 1 | Status: SHIPPED | OUTPATIENT
Start: 2023-10-30 | End: 2023-11-06

## 2023-10-30 RX ORDER — METHYLPREDNISOLONE 4 MG/1
TABLET ORAL
Qty: 1 KIT | Refills: 0 | Status: SHIPPED | OUTPATIENT
Start: 2023-10-30 | End: 2023-11-05

## 2023-10-30 RX ORDER — HYDROCODONE BITARTRATE AND ACETAMINOPHEN 5; 325 MG/1; MG/1
1 TABLET ORAL EVERY 6 HOURS PRN
Qty: 12 TABLET | Refills: 0 | Status: SHIPPED | OUTPATIENT
Start: 2023-10-30 | End: 2023-11-02

## 2024-01-08 ENCOUNTER — APPOINTMENT (OUTPATIENT)
Dept: GENERAL RADIOLOGY | Facility: CLINIC | Age: 79
End: 2024-01-08
Attending: EMERGENCY MEDICINE
Payer: MEDICARE

## 2024-01-08 ENCOUNTER — HOSPITAL ENCOUNTER (EMERGENCY)
Facility: CLINIC | Age: 79
Discharge: HOME | End: 2024-01-08
Attending: EMERGENCY MEDICINE
Payer: MEDICARE

## 2024-01-08 VITALS
SYSTOLIC BLOOD PRESSURE: 149 MMHG | DIASTOLIC BLOOD PRESSURE: 70 MMHG | BODY MASS INDEX: 26.46 KG/M2 | OXYGEN SATURATION: 99 % | HEIGHT: 64 IN | HEART RATE: 85 BPM | WEIGHT: 155 LBS | TEMPERATURE: 98 F | RESPIRATION RATE: 16 BRPM

## 2024-01-08 DIAGNOSIS — S53.401A SPRAIN OF RIGHT UPPER ARM, INITIAL ENCOUNTER: Primary | ICD-10-CM

## 2024-01-08 PROCEDURE — 6360000002 HC RX W HCPCS: Performed by: EMERGENCY MEDICINE

## 2024-01-08 PROCEDURE — 73060 X-RAY EXAM OF HUMERUS: CPT

## 2024-01-08 PROCEDURE — 99284 EMERGENCY DEPT VISIT MOD MDM: CPT

## 2024-01-08 PROCEDURE — 96372 THER/PROPH/DIAG INJ SC/IM: CPT

## 2024-01-08 RX ORDER — KETOROLAC TROMETHAMINE 30 MG/ML
30 INJECTION, SOLUTION INTRAMUSCULAR; INTRAVENOUS ONCE
Status: COMPLETED | OUTPATIENT
Start: 2024-01-08 | End: 2024-01-08

## 2024-01-08 RX ORDER — PREDNISONE 20 MG/1
20 TABLET ORAL 2 TIMES DAILY
Qty: 10 TABLET | Refills: 0 | Status: SHIPPED | OUTPATIENT
Start: 2024-01-08 | End: 2024-01-13

## 2024-01-08 RX ADMIN — KETOROLAC TROMETHAMINE 30 MG: 30 INJECTION, SOLUTION INTRAMUSCULAR; INTRAVENOUS at 13:28

## 2024-01-08 ASSESSMENT — PAIN DESCRIPTION - ONSET: ONSET: SUDDEN

## 2024-01-08 ASSESSMENT — PAIN DESCRIPTION - FREQUENCY: FREQUENCY: INTERMITTENT

## 2024-01-08 ASSESSMENT — PAIN DESCRIPTION - PAIN TYPE: TYPE: ACUTE PAIN

## 2024-01-08 ASSESSMENT — PAIN DESCRIPTION - LOCATION: LOCATION: ARM

## 2024-01-08 ASSESSMENT — PAIN DESCRIPTION - ORIENTATION: ORIENTATION: RIGHT

## 2024-01-08 ASSESSMENT — PAIN SCALES - GENERAL: PAINLEVEL_OUTOF10: 3

## 2024-01-08 ASSESSMENT — PAIN DESCRIPTION - DESCRIPTORS: DESCRIPTORS: SHARP;ACHING

## 2024-01-08 NOTE — ED PROVIDER NOTES
[facility]  [address] [address]  Phone: [phone]        [facility] ED  EMERGENCY DEPARTMENT ENCOUNTER      Pt Name: Destiney Willams  MRN: [id number]  Birthdate 1945  Date of evaluation: 1/8/2024  Provider: Brennan Perrin DO    CHIEF COMPLAINT       Chief Complaint   Patient presents with    Arm Pain     Right. Felt a pop while trying to sit up.          HISTORY OF PRESENT ILLNESS   (Location/Symptom, Timing/Onset,Context/Setting, Quality, Duration, Modifying Factors, Severity)  Note limiting factors.   Destiney Willams is a 78 y.o. female who presents to the emergency department for the evaluation of pain in her right tricep area.  Patient states that she had twisted her arm while working out and she felt a sharp pain and heard a pop.  This happened a few hours prior to arrival.  No numbness or weakness in the right arm.  No recurrent injury.  No other complaints    Nursing Notes were reviewed.    REVIEW OF SYSTEMS    (2-9systems for level 4, 10 or more for level 5)     Review of Systems   Constitutional:  Negative for fever.   Musculoskeletal:         Right arm pain   Skin:  Negative for wound.       Except asnoted above the remainder of the review of systems was reviewed and negative.       PAST MEDICAL HISTORY     Past Medical History:   Diagnosis Date    Calculus of kidney and ureter 3/27/2019    Cancer of breast (HCC) 2011    double mastectomy    Fibromyalgia     GERD (gastroesophageal reflux disease)          SURGICAL HISTORY       Past Surgical History:   Procedure Laterality Date    APPENDECTOMY      CATARACT REMOVAL      CHOLECYSTECTOMY      CYSTOURETHROSCOPY N/A 05/08/2019    Left sided retrograde pyelogram    MASTECTOMY Bilateral 11/2011    DENYS AND BSO (CERVIX REMOVED)      TONSILLECTOMY      URETER STENT PLACEMENT Left 05/09/2019    WISDOM TOOTH EXTRACTION           CURRENT MEDICATIONS     Previous Medications    ACETAMINOPHEN (TYLENOL) 325 MG TABLET    Take 2

## 2024-01-08 NOTE — ED PROVIDER NOTES
I was not involved with the patient care. I accidentally assigned myself to the chart.      Miranda Barlow, APRN - CNP  01/08/24 1176

## 2024-03-14 NOTE — PROGRESS NOTES
condition, rx med management.      No follow-ups on file.  Orders Placed This Encounter   Medications    triamcinolone (KENALOG) 0.1 % cream     Sig: Apply topically 2 times daily.     Dispense:  45 g     Refill:  0     No orders of the defined types were placed in this encounter.          I reviewed the above assessment and plan with Destiney.  Questions were answered and it appears that the Destiney has a good understanding of the visit today.    I would like to see Destiney back in No follow-ups on file. , or sooner if any new issues occur.    Electronically signed by Layla Ching DO on 3/15/2024 at 1:06 PM

## 2024-03-15 ENCOUNTER — OFFICE VISIT (OUTPATIENT)
Dept: PRIMARY CARE CLINIC | Age: 79
End: 2024-03-15

## 2024-03-15 VITALS
BODY MASS INDEX: 26.98 KG/M2 | DIASTOLIC BLOOD PRESSURE: 72 MMHG | HEIGHT: 64 IN | SYSTOLIC BLOOD PRESSURE: 126 MMHG | HEART RATE: 76 BPM | WEIGHT: 158 LBS | OXYGEN SATURATION: 98 %

## 2024-03-15 DIAGNOSIS — L30.9 DERMATITIS: Primary | ICD-10-CM

## 2024-03-15 RX ORDER — TRIAMCINOLONE ACETONIDE 1 MG/G
CREAM TOPICAL
Qty: 45 G | Refills: 0 | Status: SHIPPED | OUTPATIENT
Start: 2024-03-15

## 2024-03-15 ASSESSMENT — PATIENT HEALTH QUESTIONNAIRE - PHQ9
2. FEELING DOWN, DEPRESSED OR HOPELESS: 0
SUM OF ALL RESPONSES TO PHQ QUESTIONS 1-9: 0
SUM OF ALL RESPONSES TO PHQ QUESTIONS 1-9: 0
1. LITTLE INTEREST OR PLEASURE IN DOING THINGS: 0
SUM OF ALL RESPONSES TO PHQ QUESTIONS 1-9: 0
SUM OF ALL RESPONSES TO PHQ QUESTIONS 1-9: 0
SUM OF ALL RESPONSES TO PHQ9 QUESTIONS 1 & 2: 0

## 2024-04-25 ENCOUNTER — HOSPITAL ENCOUNTER (EMERGENCY)
Facility: CLINIC | Age: 79
Discharge: HOME | End: 2024-04-25
Attending: EMERGENCY MEDICINE
Payer: MEDICARE

## 2024-04-25 ENCOUNTER — APPOINTMENT (OUTPATIENT)
Dept: ULTRASOUND IMAGING | Facility: CLINIC | Age: 79
End: 2024-04-25
Payer: MEDICARE

## 2024-04-25 VITALS
WEIGHT: 155 LBS | TEMPERATURE: 98 F | DIASTOLIC BLOOD PRESSURE: 98 MMHG | HEART RATE: 68 BPM | RESPIRATION RATE: 15 BRPM | OXYGEN SATURATION: 97 % | BODY MASS INDEX: 27.03 KG/M2 | SYSTOLIC BLOOD PRESSURE: 122 MMHG

## 2024-04-25 DIAGNOSIS — M79.89 LEG SWELLING: Primary | ICD-10-CM

## 2024-04-25 PROCEDURE — 99284 EMERGENCY DEPT VISIT MOD MDM: CPT

## 2024-04-25 PROCEDURE — 93971 EXTREMITY STUDY: CPT

## 2024-04-25 ASSESSMENT — PAIN SCALES - GENERAL: PAINLEVEL_OUTOF10: 4

## 2024-04-25 NOTE — DISCHARGE INSTRUCTIONS
Continue heat and ice  Motrin and or Tylenol for pain  Return immediately if any worsening symptoms any worsening swelling, redness, fevers or any other concerns    Please understand that early in the process of an illness or injury, an emergency department workup can be falsely reassuring.      Tell us how we did visit: [email or web address]   and let us know about your experience

## 2024-04-25 NOTE — ED PROVIDER NOTES
Department Physician who either signs or Co-signs this chart in the absence of a cardiologist.        Not indicated unless otherwise documented above    LABS:  No results found for this visit on 04/25/24.    Not indicated unless otherwise documented above    RADIOLOGY:   I reviewed the radiologist interpretations:    Vascular duplex lower extremity venous right   Preliminary Result   1. No evidence of DVT in the right lower extremity.   2. Mixed anechoic/hypoechoic elongated ovoid focus within the medial right   calf without internal vascularity felt most compatible with complex fluid   collection, likely hematoma and appears to be intramuscular, likely within   the medial head gastrocnemius muscle.  This measures 7.5 x 1.1 x 2.1 cm.   Consider further evaluation with MRI as clinically indicated.             Not indicated unless otherwise documented above    EMERGENCY DEPARTMENT COURSE:     The patient was given the following medications:  No orders of the defined types were placed in this encounter.       Vitals:   -------------------------  BP (!) 122/98   Pulse 68   Temp 98 °F (36.7 °C)   Resp 15   Wt 70.3 kg (155 lb)   SpO2 97%   BMI 27.03 kg/m²     10:35 AM ultrasound shows a hematoma medial aspect of the right calf no DVT.  This is where her discomfort is.  Recommend compression stockings Motrin or Tylenol for pain.  Ice as needed.  Follow-up with family physician for reevaluation return if worsening symptoms or any other concerns.    CRITICAL CARE:    None    PROCEDURES:    None      OARRS Report if indicated           The patient understands that at this time there is no evidence for a more malignant underlying process, but also understands that early in the process of an illness or injury, an emergency department workup can be falsely reassuring.  Routine discharge counseling was given, and it is understood that worsening, changing or persistent symptoms should prompt an immediate call or follow up

## 2024-05-01 ENCOUNTER — OFFICE VISIT (OUTPATIENT)
Dept: PRIMARY CARE CLINIC | Age: 79
End: 2024-05-01

## 2024-05-01 VITALS
DIASTOLIC BLOOD PRESSURE: 76 MMHG | HEART RATE: 87 BPM | OXYGEN SATURATION: 97 % | BODY MASS INDEX: 28 KG/M2 | WEIGHT: 158 LBS | HEIGHT: 63 IN | SYSTOLIC BLOOD PRESSURE: 128 MMHG

## 2024-05-01 DIAGNOSIS — M79.604 RIGHT LEG PAIN: Primary | ICD-10-CM

## 2024-05-01 DIAGNOSIS — M79.671 RIGHT FOOT PAIN: ICD-10-CM

## 2024-05-01 NOTE — PROGRESS NOTES
[facility]  [address] [place] [address]  Phone: [phone]       Name: Destiney Willams  : 1945     Chief Complaint:    Destiney Willams is a 78 y.o. year old female who presents today for   Chief Complaint   Patient presents with    Leg Swelling       History of Present Illness:      R leg swelling above knee down to foot X 10 days- 24 [place]- they did venous doppler and was normal. Her symptoms started last Saturday (). She started doing cardio drumming about 3 weeks ago, and she was doing cardio drumming at home by herself that day. The pain started after that. She did go to ER 5 days later. Doppler was done and was negative. She is tender - the pain seems to move. And her right leg is swollen but not her left.       Medications:    Outpatient Medications Prior to Visit   Medication Sig Dispense Refill    triamcinolone (KENALOG) 0.1 % cream Apply topically 2 times daily. 45 g 0    Multiple Vitamins-Minerals (PRESERVISION AREDS PO) Take by mouth      Multiple Vitamin (MULTIVITAMIN ADULT PO) Take 1 tablet by mouth daily      acetaminophen (TYLENOL) 325 MG tablet Take 2 tablets by mouth every 6 hours as needed      ibuprofen (ADVIL;MOTRIN) 600 MG tablet Take 1 tablet by mouth every 6 hours as needed for Pain (Patient not taking: Reported on 2024) 30 tablet 0     No facility-administered medications prior to visit.       Review of Systems:     Review of Systems     Physical Exam:     Vitals:  /76 (Site: Left Upper Arm, Position: Sitting, Cuff Size: Small Adult)   Pulse 87   Ht 1.588 m (5' 2.5\")   Wt 71.7 kg (158 lb)   SpO2 97%   BMI 28.44 kg/m²  Body mass index is 28.44 kg/m².    Physical Exam  Vitals and nursing note reviewed.   Constitutional:       Appearance: Normal appearance.   Pulmonary:      Effort: Pulmonary effort is normal.   Musculoskeletal:      Comments: Right leg with pitting edema. Tenderness along shin, into foot and top of foot. Limited

## 2024-05-02 ENCOUNTER — TELEPHONE (OUTPATIENT)
Dept: PRIMARY CARE CLINIC | Age: 79
End: 2024-05-02

## 2024-05-02 NOTE — TELEPHONE ENCOUNTER
Pt notified by phone not read yet and will call with results when we get them and she said she can not wait another day or 2 because she is \"dying with her legs\" I told her if she does not hear from us by the end of the day today and she is that bad she needs to go to the ER and they can do stat testing

## 2024-05-03 ENCOUNTER — APPOINTMENT (OUTPATIENT)
Dept: ULTRASOUND IMAGING | Facility: CLINIC | Age: 79
End: 2024-05-03
Payer: MEDICARE

## 2024-05-03 ENCOUNTER — TELEPHONE (OUTPATIENT)
Dept: PRIMARY CARE CLINIC | Age: 79
End: 2024-05-03

## 2024-05-03 ENCOUNTER — HOSPITAL ENCOUNTER (EMERGENCY)
Facility: CLINIC | Age: 79
Discharge: HOME | End: 2024-05-03
Attending: EMERGENCY MEDICINE
Payer: MEDICARE

## 2024-05-03 VITALS
BODY MASS INDEX: 29.08 KG/M2 | OXYGEN SATURATION: 99 % | DIASTOLIC BLOOD PRESSURE: 68 MMHG | RESPIRATION RATE: 17 BRPM | SYSTOLIC BLOOD PRESSURE: 149 MMHG | HEART RATE: 83 BPM | TEMPERATURE: 98.2 F | WEIGHT: 158 LBS | HEIGHT: 62 IN

## 2024-05-03 DIAGNOSIS — S86.111A GASTROCNEMIUS MUSCLE RUPTURE, RIGHT, INITIAL ENCOUNTER: ICD-10-CM

## 2024-05-03 DIAGNOSIS — M79.604 RIGHT LEG PAIN: ICD-10-CM

## 2024-05-03 DIAGNOSIS — S80.11XA HEMATOMA OF RIGHT LOWER LEG: Primary | ICD-10-CM

## 2024-05-03 DIAGNOSIS — M79.661 RIGHT CALF PAIN: Primary | ICD-10-CM

## 2024-05-03 DIAGNOSIS — T14.8XXA HEMATOMA: ICD-10-CM

## 2024-05-03 PROCEDURE — 93971 EXTREMITY STUDY: CPT

## 2024-05-03 PROCEDURE — 99284 EMERGENCY DEPT VISIT MOD MDM: CPT

## 2024-05-03 RX ORDER — ACETAMINOPHEN AND CODEINE PHOSPHATE 300; 30 MG/1; MG/1
1 TABLET ORAL EVERY 4 HOURS PRN
Qty: 18 TABLET | Refills: 0 | Status: SHIPPED | OUTPATIENT
Start: 2024-05-03 | End: 2024-05-06

## 2024-05-03 ASSESSMENT — PAIN SCALES - GENERAL: PAINLEVEL_OUTOF10: 3

## 2024-05-03 ASSESSMENT — PAIN - FUNCTIONAL ASSESSMENT: PAIN_FUNCTIONAL_ASSESSMENT: 0-10

## 2024-05-03 NOTE — DISCHARGE INSTRUCTIONS
Rest, ice, elevate the leg and apply ice.  Take the medication as needed for pain.  Do not bear weight to right lower extremity and use crutches provided.  You need to follow-up with your primary care doctor or podiatrist to discuss an MRI as the next step in the evaluation of your right leg problem.  You need to discuss ultrasound results with your primary care doctor.  Return to the emergency department with any problems or concerns as discussed.

## 2024-05-03 NOTE — TELEPHONE ENCOUNTER
Last appt 05/01/24  Next appt None found    Pt called stating she viewed negative imaging results. Would like to know what PCP recommends for next steps since RLE pain has been 2 weeks now.    Please advise. Thank you.

## 2024-05-03 NOTE — ED PROVIDER NOTES
[facility]  [address] [id number]  Phone: [phone]  EMERGENCY DEPARTMENT ENCOUNTER      Pt Name: Destiney Willams  MRN: [id number]  Birthdate 1945  Date of evaluation: 5/3/2024    CHIEF COMPLAINT       Chief Complaint   Patient presents with    Leg Pain     Right leg pain x 2 weeks. Pt was already seen here, had US, no clot, pt has also had xray of leg. Pt rates pain at a 3 out of 10       HISTORY OF PRESENT ILLNESS    Destiney Willams is a 78 y.o. female who presents the emergency department complaining of right calf pain ongoing for 2 weeks.  Patient states she is doing drumming for a workout when she did a wrong move during the routine and felt immediate pain to the right calf.  She has pain with range of motion and bearing weight since then.  She came to the emergency department had an ultrasound done which did not show any DVT and showed a hematoma.  The patient followed up with her primary care doctor who ordered an x-ray and it was negative.  Patient has been elevating the leg, applying ice and heat, taking Tylenol and the swelling is still the same, the ecchymosis has resolved but she is concerned because she has not been able to get to her normal activity.  She denies any paresthesias, or weakness.  She denies any rash.  Denies any chest pain, shortness of breath.  She denies any fever, chills, or cough.    REVIEW OF SYSTEMS     Review of Systems   All other systems reviewed and are negative.    PAST MEDICAL HISTORY    has a past medical history of Calculus of kidney and ureter, Cancer of breast (HCC), Fibromyalgia, GERD (gastroesophageal reflux disease), Obesity, Osteoarthritis, and Urinary incontinence.    SURGICAL HISTORY      has a past surgical history that includes Mastectomy (Bilateral, 11/2011); Cholecystectomy; [place] tooth extraction; Appendectomy; Tonsillectomy; Cataract removal; Total abdominal hysterectomy w/ bilateral salpingoophorectomy; cystourethroscopy (N/A,

## 2024-05-13 ENCOUNTER — HOSPITAL ENCOUNTER (OUTPATIENT)
Dept: MRI IMAGING | Age: 79
Discharge: HOME | End: 2024-05-15
Payer: MEDICARE

## 2024-05-13 DIAGNOSIS — S80.11XA HEMATOMA OF RIGHT LOWER LEG: ICD-10-CM

## 2024-05-13 DIAGNOSIS — M79.604 RIGHT LEG PAIN: ICD-10-CM

## 2024-05-13 PROCEDURE — 73718 MRI LOWER EXTREMITY W/O DYE: CPT

## 2024-05-22 ENCOUNTER — TELEPHONE (OUTPATIENT)
Dept: PRIMARY CARE CLINIC | Age: 79
End: 2024-05-22

## 2024-05-22 NOTE — TELEPHONE ENCOUNTER
Last appt 05/01/24  Next appt None found    Pt called asking if she will be able to fly this weekend? There was a death in family.    She has concerns due to her torn muscle in right leg, states healing but still has swelling.    Please advise. Thank you.

## 2025-03-28 RX ORDER — METHYLPREDNISOLONE 4 MG/1
TABLET ORAL
Qty: 1 KIT | Refills: 0 | OUTPATIENT
Start: 2025-03-28 | End: 2025-04-03

## 2025-03-28 NOTE — TELEPHONE ENCOUNTER
Patient states she just needs refill on the oxycodone and Medrol Dosepack  LAST VISIT:   5/1/2024     Future Appointments   Date Time Provider Department Center   4/9/2025  2:30 PM Layla Ching DO [facility] DEP

## 2025-03-28 NOTE — TELEPHONE ENCOUNTER
This is something that is typically only used as needed, for specific conditions. If she is having a problem - should be evaluated

## 2025-06-16 ENCOUNTER — APPOINTMENT (OUTPATIENT)
Dept: GENERAL RADIOLOGY | Facility: CLINIC | Age: 80
End: 2025-06-16
Payer: MEDICARE

## 2025-06-16 ENCOUNTER — HOSPITAL ENCOUNTER (EMERGENCY)
Facility: CLINIC | Age: 80
Discharge: HOME | End: 2025-06-16
Attending: STUDENT IN AN ORGANIZED HEALTH CARE EDUCATION/TRAINING PROGRAM
Payer: MEDICARE

## 2025-06-16 VITALS
TEMPERATURE: 98 F | HEIGHT: 63 IN | SYSTOLIC BLOOD PRESSURE: 162 MMHG | HEART RATE: 89 BPM | OXYGEN SATURATION: 97 % | RESPIRATION RATE: 18 BRPM | WEIGHT: 155 LBS | DIASTOLIC BLOOD PRESSURE: 109 MMHG | BODY MASS INDEX: 27.46 KG/M2

## 2025-06-16 DIAGNOSIS — S80.12XA CONTUSION OF LEFT LOWER EXTREMITY, INITIAL ENCOUNTER: ICD-10-CM

## 2025-06-16 DIAGNOSIS — M79.605 LEFT LEG PAIN: ICD-10-CM
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
## 2025-06-16 PROCEDURE — 99283 EMERGENCY DEPT VISIT LOW MDM: CPT

## 2025-06-16 PROCEDURE — 73590 X-RAY EXAM OF LOWER LEG: CPT

## 2025-06-16 RX ORDER — ACETAMINOPHEN 500 MG
1000 TABLET ORAL ONCE
Status: DISCONTINUED | OUTPATIENT
Start: 2025-06-16 | End: 2025-06-16 | Stop reason: HOSPADM

## 2025-06-16 RX ORDER — KETOROLAC TROMETHAMINE 30 MG/ML
30 INJECTION, SOLUTION INTRAMUSCULAR; INTRAVENOUS ONCE
Status: DISCONTINUED | OUTPATIENT
Start: 2025-06-16 | End: 2025-06-16 | Stop reason: HOSPADM

## 2025-06-16 ASSESSMENT — PAIN - FUNCTIONAL ASSESSMENT
PAIN_FUNCTIONAL_ASSESSMENT: ACTIVITIES ARE NOT PREVENTED
PAIN_FUNCTIONAL_ASSESSMENT: 0-10

## 2025-06-16 ASSESSMENT — PAIN DESCRIPTION - DESCRIPTORS: DESCRIPTORS: SORE

## 2025-06-16 ASSESSMENT — PAIN DESCRIPTION - ONSET: ONSET: ON-GOING

## 2025-06-16 ASSESSMENT — PAIN DESCRIPTION - ORIENTATION: ORIENTATION: LEFT

## 2025-06-16 ASSESSMENT — PAIN DESCRIPTION - LOCATION: LOCATION: LEG

## 2025-06-16 ASSESSMENT — PAIN DESCRIPTION - FREQUENCY: FREQUENCY: CONTINUOUS

## 2025-06-16 ASSESSMENT — PAIN DESCRIPTION - PAIN TYPE: TYPE: ACUTE PAIN

## 2025-06-16 ASSESSMENT — PAIN SCALES - GENERAL: PAINLEVEL_OUTOF10: 2

## 2025-06-16 NOTE — ED PROVIDER NOTES
Patient care was transferred to me at change of shift by Dr. Crabtree who performed the initial evaluation.  X-rays do not reveal any fracture.  She has small amount swelling and bruising over the medial aspect of the distal left lower calf with some underlying tenderness consistent with soft tissue contusion.  Ace wrap is applied for compression and comfort and she is discharged with supportive care instructions advised.  She is to return anytime for worsening symptoms.        Summation      Patient Course: Discharged    ED Medications administered this visit:    Medications   ketorolac (TORADOL) injection 30 mg (30 mg IntraMUSCular Not Given 6/16/25 0730)   acetaminophen (TYLENOL) tablet 1,000 mg (1,000 mg Oral Not Given 6/16/25 0730)       New Prescriptions from this visit:    New Prescriptions    No medications on file       Follow-up:  Layla Ching S, DO  [address] [id number] [phone]              Final Impression:   1. Fall, initial encounter    2. Left leg pain    3. Contusion of left lower extremity, initial encounter               (Please note that portions of this note were completed with a voice recognition program.  Efforts were made to edit the dictations but occasionally words are mis-transcribed.)     Carl Allen MD  06/16/25 0267

## 2025-06-16 NOTE — DISCHARGE INSTRUCTIONS
Elevation.  Ice.  Voltaren gel topically twice a day as needed.  Return for worsening symptoms.   Size Of Lesion In Cm: 0.8 Render Post-Care Instructions In Note?: no Anesthesia Type: 1% lidocaine with 1:100,000 epinephrine and a 1:6 solution of 8.4% sodium bicarbonate Type Of Destruction Used: Curettage Additional Anesthesia Volume In Cc (Will Not Render If 0): 0 Consent: Verbal consent was obtained and risks were reviewed including but not limited to scarring, infection, bleeding, scabbing, incomplete removal, nerve damage and allergy to anesthesia. Path Notes (To The Dermatopathologist): 8mm\\nVictoria Anesthesia Volume In Cc (Will Not Render If 0): 0.1 Wound Care: Aquaphor Biopsy Method: Dermablade Cryotherapy Text: The wound bed was treated with cryotherapy after the biopsy was performed. Electrodesiccation And Curettage Text: The wound bed was treated with electrodesiccation and curettage after the biopsy was performed. Post-Care Instructions: I reviewed with the patient in detail post-care instructions. Patient is to keep the biopsy site dry overnight, and then apply aquaphor twice daily until healed. Was A Bandage Applied: Yes Dressing: brown paper tape Billing Type: Third-Party Bill Electrodesiccation Text: The wound bed was treated with electrodesiccation after the biopsy was performed. Biopsy Type: H and E Silver Nitrate Text: The wound bed was treated with silver nitrate after the biopsy was performed. Detail Level: Detailed Depth Of Biopsy: dermis Hemostasis: Pressure

## 2025-06-16 NOTE — ED PROVIDER NOTES
[facility]  [address] [id number]  Phone: [phone]        [facility] EMERGENCY DEPARTMENT  EMERGENCY DEPARTMENT ENCOUNTER      Pt Name: Destiney Willams  MRN: [id number]  Birthdate 1945  Date of evaluation: 6/16/2025  Provider: Parvin Crabtree DO    CHIEF COMPLAINT       Chief Complaint   Patient presents with    Leg Pain       HISTORY OF PRESENT ILLNESS   (Location/Symptom, Timing/Onset,Context/Setting, Quality, Duration, Modifying Factors, Severity)  Note limiting factors.     Destiney Willams is a 79 y.o. female who presents to the emergency department with left leg pain and swelling.  Pain is mainly around her left calf.  Patient states yesterday she was gardening, had her leg propped up on a step and then lost her balance and fell backwards and hurt her leg.  Did not strike head, no LOC, no thinners but able to get up after the fall.  Patient states that she has noticed some more pain and swelling about her left leg so want to get it checked out.  Patient states about a year ago she had a calf injury however this is in her right calf, no previous injuries to this left calf.  Has not yet take anything today for the symptoms, did use some Tylenol yesterday.    Nursing Notes were reviewed.    REVIEW OF SYSTEMS       Review of Systems   Constitutional:  Negative for chills and fever.   Musculoskeletal:  Positive for myalgias.   Skin:  Negative for rash and wound.   Neurological:  Negative for headaches.       PAST MEDICAL HISTORY     Past Medical History:   Diagnosis Date    Calculus of kidney and ureter 03/27/2019    Cancer of breast (HCC) 01/01/2011    double mastectomy    Fibromyalgia     GERD (gastroesophageal reflux disease)     Obesity     Osteoarthritis     Worse with age    Urinary incontinence        SURGICAL HISTORY       Past Surgical History:   Procedure Laterality Date    APPENDECTOMY      CATARACT REMOVAL      CHOLECYSTECTOMY      CYSTOURETHROSCOPY N/A

## 2025-06-27 ENCOUNTER — HOSPITAL ENCOUNTER (EMERGENCY)
Facility: CLINIC | Age: 80
Discharge: HOME | End: 2025-06-27
Attending: EMERGENCY MEDICINE
Payer: MEDICARE

## 2025-06-27 ENCOUNTER — APPOINTMENT (OUTPATIENT)
Dept: ULTRASOUND IMAGING | Facility: CLINIC | Age: 80
End: 2025-06-27
Payer: MEDICARE

## 2025-06-27 VITALS
DIASTOLIC BLOOD PRESSURE: 52 MMHG | WEIGHT: 155 LBS | RESPIRATION RATE: 15 BRPM | HEIGHT: 63 IN | TEMPERATURE: 98.2 F | OXYGEN SATURATION: 97 % | BODY MASS INDEX: 27.46 KG/M2 | SYSTOLIC BLOOD PRESSURE: 136 MMHG | HEART RATE: 86 BPM

## 2025-06-27 DIAGNOSIS — S80.12XA HEMATOMA OF LEFT LOWER LEG: ICD-10-CM

## 2025-06-27 DIAGNOSIS — M79.605 LEFT LEG PAIN: Primary | ICD-10-CM

## 2025-06-27 PROCEDURE — 93971 EXTREMITY STUDY: CPT

## 2025-06-27 PROCEDURE — 99284 EMERGENCY DEPT VISIT MOD MDM: CPT

## 2025-06-27 ASSESSMENT — ENCOUNTER SYMPTOMS
NAUSEA: 0
SHORTNESS OF BREATH: 0
COUGH: 0
VOMITING: 0
DIARRHEA: 0
SINUS PAIN: 0
SORE THROAT: 0
ABDOMINAL PAIN: 0

## 2025-06-27 ASSESSMENT — LIFESTYLE VARIABLES
HOW MANY STANDARD DRINKS CONTAINING ALCOHOL DO YOU HAVE ON A TYPICAL DAY: PATIENT DOES NOT DRINK
HOW OFTEN DO YOU HAVE A DRINK CONTAINING ALCOHOL: NEVER

## 2025-06-27 ASSESSMENT — PAIN SCALES - GENERAL
PAINLEVEL_OUTOF10: 4
PAINLEVEL_OUTOF10: 2

## 2025-06-27 ASSESSMENT — PAIN DESCRIPTION - DESCRIPTORS: DESCRIPTORS: ACHING

## 2025-06-27 ASSESSMENT — PAIN DESCRIPTION - LOCATION: LOCATION: LEG

## 2025-06-27 ASSESSMENT — PAIN - FUNCTIONAL ASSESSMENT
PAIN_FUNCTIONAL_ASSESSMENT: 0-10
PAIN_FUNCTIONAL_ASSESSMENT: ACTIVITIES ARE NOT PREVENTED

## 2025-06-27 ASSESSMENT — PAIN DESCRIPTION - ORIENTATION: ORIENTATION: LEFT

## 2025-06-27 ASSESSMENT — PAIN DESCRIPTION - PAIN TYPE: TYPE: ACUTE PAIN

## 2025-06-27 NOTE — ED PROVIDER NOTES
eMERGENCY dEPARTMENT  Attending Physician Attestation     Pt Name: Destiney Willams  MRN: [id number]  Birthdate 1945  Date of evaluation: 6/27/25     Destiney Willams is a 79 y.o. female with CC: Leg Pain (Left leg pain was seen here on 6/16 for it. Has not improved )      I was available to render services if needed but did not directly participate in the care of the patient.    Vitals Reviewed:    Vitals:    06/27/25 0959   BP: (!) 136/52   Pulse: 86   Resp: 15   Temp: 98.2 °F (36.8 °C)   TempSrc: Oral   SpO2: 97%   Weight: 70.3 kg (155 lb)   Height: 1.6 m (5' 3\")       Initial Pain Score: Pain Level: 2 (06/27/25 0959)  Last Pain Score: Pain Level: 4 (06/27/25 1232)    Carl Allen MD  Attending Emergency Physician                Carl Allen MD  06/27/25 6751

## 2025-06-27 NOTE — DISCHARGE INSTRUCTIONS
Recommend Heat, compression, elevation and ibuprofen as needed for discomfort, follow up with pcp / ortho, return to er as needed

## 2025-06-27 NOTE — ED PROVIDER NOTES
[facility]  [address] [facility] [address]  Phone: [phone]        Pt Name: Destiney Willams  MRN: [id number]  Birthdate 1945  Date of evaluation: 6/27/25    CHIEFCOMPLAINT       Chief Complaint   Patient presents with    Leg Pain     Left leg pain was seen here on 6/16 for it. Has not improved        HISTORY OF PRESENT ILLNESS (Location/Symptom, Timing/Onset, Context/Setting, Quality, Duration, Modifying Factors, Severity)      Destiney Willams is a 79 y.o. female with no pertinent PMH who presents to the ED via private auto with with concern for left leg pain and swelling.  Patient reports that she had a trip and fall approximately 2 weeks ago.  Patient was seen had x-rays of the left lower extremity that was negative for fracture or dislocation.  Patient has been treating supportively with rest ice compression elevation.  Patient is concerned she has persistent redness, pain, swelling to the left lower leg.  Patient is not on any anticoagulation.  Denies any additional injury or trauma.  Patient is ambulatory with intact sensation distally    PAST MEDICAL / SURGICAL / SOCIAL / FAMILY HISTORY     PMH:  has a past medical history of Calculus of kidney and ureter, Cancer of breast (HCC), Fibromyalgia, GERD (gastroesophageal reflux disease), Obesity, Osteoarthritis, and Urinary incontinence.  Surgical History:  has a past surgical history that includes Mastectomy (Bilateral, 11/2011); Cholecystectomy; [place] tooth extraction; Appendectomy; Tonsillectomy; Cataract removal; Total abdominal hysterectomy w/ bilateral salpingoophorectomy; cystourethroscopy (N/A, 05/08/2019); Ureter stent placement (Left, 05/09/2019); Mastectomy, bilateral; and Hysterectomy, total abdominal.  Social History:  reports that she quit smoking about 57 years ago. Her smoking use included cigarettes. She started smoking about 65 years ago. She has a 4 pack-year smoking history. She has never used smokeless tobacco.

## 2025-07-08 ENCOUNTER — PATIENT MESSAGE (OUTPATIENT)
Dept: ORTHOPEDIC SURGERY | Age: 80
End: 2025-07-08

## 2025-07-08 ENCOUNTER — OFFICE VISIT (OUTPATIENT)
Dept: ORTHOPEDIC SURGERY | Age: 80
End: 2025-07-08
Payer: MEDICARE

## 2025-07-08 VITALS — BODY MASS INDEX: 27.46 KG/M2 | HEIGHT: 63 IN | RESPIRATION RATE: 14 BRPM | WEIGHT: 155 LBS

## 2025-07-08 DIAGNOSIS — M79.89 PAIN AND SWELLING OF LEFT LOWER LEG: Primary | ICD-10-CM

## 2025-07-08 DIAGNOSIS — M79.662 PAIN AND SWELLING OF LEFT LOWER LEG: Primary | ICD-10-CM

## 2025-07-08 DIAGNOSIS — S93.402A INVERSION SPRAIN OF LEFT ANKLE, INITIAL ENCOUNTER: ICD-10-CM

## 2025-07-08 PROCEDURE — 1125F AMNT PAIN NOTED PAIN PRSNT: CPT | Performed by: PHYSICIAN ASSISTANT

## 2025-07-08 PROCEDURE — 1123F ACP DISCUSS/DSCN MKR DOCD: CPT | Performed by: PHYSICIAN ASSISTANT

## 2025-07-08 PROCEDURE — 99203 OFFICE O/P NEW LOW 30 MIN: CPT | Performed by: PHYSICIAN ASSISTANT

## 2025-07-08 PROCEDURE — 1159F MED LIST DOCD IN RCRD: CPT | Performed by: PHYSICIAN ASSISTANT

## 2025-07-08 RX ORDER — DOXYCYCLINE 100 MG/1
100 CAPSULE ORAL 2 TIMES DAILY
COMMUNITY
Start: 2025-07-06 | End: 2025-07-11

## 2025-07-08 ASSESSMENT — ENCOUNTER SYMPTOMS
COUGH: 0
COLOR CHANGE: 0
SHORTNESS OF BREATH: 0
VOMITING: 0

## 2025-07-08 NOTE — PROGRESS NOTES
[facility] ORTHOPEDICS AND SPORTS MEDICINE  [address] [address]  [address]  [place] [id number]  Dept: [phone]    Ambulatory Orthopedic New Patient Visit      CHIEF COMPLAINT:    Chief Complaint   Patient presents with    New Patient     Left lower leg         HISTORY OF PRESENT ILLNESS:      Date of Injury: 6/15/2025    History of Present Illness  The patient is a 79-year-old female here today for a new patient appointment regarding a left leg injury that occurred approximately on 06/15/2025 when she fell backwards off a stool while seated in her garden, injuring her left lower leg.    She was seen in the ER on 06/16/2025 and had an x-ray which was negative. She returned on 06/27/2025 for continued pain and swelling, and a left lower extremity DVT ultrasound was ordered, which was negative for DVT but showed an area of a hematoma. The patient then returned to a local urgent care with concern for possible infection and was started on 5 days of doxycycline for prophylactic coverage of possible cellulitis, which the clinician was hesitant to prescribe due to evaluation revealing no gross signs of infection. She has been taking doxycycline since Sunday and notes improvement in the leg redness.    She recounts an incident in mid-June where she fell backwards while gardening, causing her heel to strike her leg. She sought immediate medical attention at the emergency room the following morning, where it was determined that no bones were fractured. However, she continued to experience pain and swelling in her left lower leg, prompting a return visit to the ER 11 days later on 6/27/2025. An vascular ultrasound performed at that time revealed no blood clots but did identify a hematoma. Her neighbor, a retired trauma nurse, suspected cellulitis and advised her to seek further evaluation at a local urgent care.     However, the nurse at urgent care

## 2025-07-16 ASSESSMENT — PATIENT HEALTH QUESTIONNAIRE - PHQ9
SUM OF ALL RESPONSES TO PHQ QUESTIONS 1-9: 0
SUM OF ALL RESPONSES TO PHQ QUESTIONS 1-9: 0
SUM OF ALL RESPONSES TO PHQ9 QUESTIONS 1 & 2: 0
2. FEELING DOWN, DEPRESSED OR HOPELESS: NOT AT ALL
SUM OF ALL RESPONSES TO PHQ QUESTIONS 1-9: 0
1. LITTLE INTEREST OR PLEASURE IN DOING THINGS: NOT AT ALL
1. LITTLE INTEREST OR PLEASURE IN DOING THINGS: NOT AT ALL
SUM OF ALL RESPONSES TO PHQ QUESTIONS 1-9: 0
2. FEELING DOWN, DEPRESSED OR HOPELESS: NOT AT ALL

## 2025-07-17 ENCOUNTER — HOSPITAL ENCOUNTER (OUTPATIENT)
Facility: CLINIC | Age: 80
Discharge: HOME | End: 2025-07-19
Payer: MEDICARE

## 2025-07-17 DIAGNOSIS — M79.662 PAIN AND SWELLING OF LEFT LOWER LEG: ICD-10-CM

## 2025-07-17 DIAGNOSIS — M79.89 PAIN AND SWELLING OF LEFT LOWER LEG: ICD-10-CM

## 2025-07-17 PROCEDURE — 73718 MRI LOWER EXTREMITY W/O DYE: CPT

## 2025-07-20 SDOH — ECONOMIC STABILITY: INCOME INSECURITY: IN THE LAST 12 MONTHS, WAS THERE A TIME WHEN YOU WERE NOT ABLE TO PAY THE MORTGAGE OR RENT ON TIME?: NO

## 2025-07-20 SDOH — ECONOMIC STABILITY: TRANSPORTATION INSECURITY
IN THE PAST 12 MONTHS, HAS THE LACK OF TRANSPORTATION KEPT YOU FROM MEDICAL APPOINTMENTS OR FROM GETTING MEDICATIONS?: NO

## 2025-07-20 SDOH — ECONOMIC STABILITY: FOOD INSECURITY: WITHIN THE PAST 12 MONTHS, YOU WORRIED THAT YOUR FOOD WOULD RUN OUT BEFORE YOU GOT MONEY TO BUY MORE.: NEVER TRUE

## 2025-07-20 SDOH — ECONOMIC STABILITY: FOOD INSECURITY: WITHIN THE PAST 12 MONTHS, THE FOOD YOU BOUGHT JUST DIDN'T LAST AND YOU DIDN'T HAVE MONEY TO GET MORE.: NEVER TRUE

## 2025-07-20 SDOH — ECONOMIC STABILITY: TRANSPORTATION INSECURITY
IN THE PAST 12 MONTHS, HAS LACK OF TRANSPORTATION KEPT YOU FROM MEETINGS, WORK, OR FROM GETTING THINGS NEEDED FOR DAILY LIVING?: NO

## 2025-07-21 NOTE — PROGRESS NOTES
[facility]  [address] [place] [address]  Phone: [phone]       Name: Destiney Willams  : 1945     Chief Complaint:    Destiney Willams is a 79 y.o. year old female who presents today for   Chief Complaint   Patient presents with    1 Year Follow Up     Fell 1 month ago, has seen ED, Urgent Care, Ortho, has had MRI and has had bout of cellulitis, still having swelling and pain of her left leg, ankle and foot       History of Present Illness:    Notes from past visit(s) and/or chart review:     Asking about cancer screening/imaging.     Subjective   History of Present Illness  The patient presents for evaluation of leg swelling, Raynaud's disease, and medication management.    She experienced a fall while gardening a month ago, resulting in her heel impacting her leg. This incident led to significant swelling in her entire leg, which has persisted for a month. She sought medical attention at the emergency room twice, where an x-ray and ultrasound were performed to rule out a blood clot. She was put on a course of steroids at one point. Further x-rays revealed no fractures. Both the emergency room and urgent care referred her to an orthopedic surgeon, who advised her to use a boot. She found the boot uncomfortable and returned it. She has been using an Ace bandage for support/compression but reports no improvement in her condition. She notes a slight reduction in swelling and pain, but her ankle and foot remain puffy. An MRI was performed over a week ago, but she has not received the results yet. A hematoma was identified on an x-ray, which was expected to resolve spontaneously.    She occasionally experiences sudden pain in her finger, which turns purple, leading her to suspect a broken blood vessel. This symptom resolves on its own. She has also noticed that the entire end of her thumb turns blue, a symptom that has occurred about three times. She wonders if these symptoms could be

## 2025-07-23 ENCOUNTER — PATIENT MESSAGE (OUTPATIENT)
Dept: PRIMARY CARE CLINIC | Age: 80
End: 2025-07-23

## 2025-07-23 ENCOUNTER — HOSPITAL ENCOUNTER (OUTPATIENT)
Age: 80
Setting detail: SPECIMEN
Discharge: HOME | End: 2025-07-23

## 2025-07-23 ENCOUNTER — OFFICE VISIT (OUTPATIENT)
Dept: PRIMARY CARE CLINIC | Age: 80
End: 2025-07-23

## 2025-07-23 VITALS
DIASTOLIC BLOOD PRESSURE: 70 MMHG | HEIGHT: 62 IN | WEIGHT: 164.6 LBS | HEART RATE: 89 BPM | BODY MASS INDEX: 30.29 KG/M2 | RESPIRATION RATE: 16 BRPM | SYSTOLIC BLOOD PRESSURE: 130 MMHG | OXYGEN SATURATION: 99 %

## 2025-07-23 DIAGNOSIS — M79.10 MYALGIA: ICD-10-CM

## 2025-07-23 DIAGNOSIS — Z91.81 AT HIGH RISK FOR FALLS: ICD-10-CM

## 2025-07-23 DIAGNOSIS — E78.5 DYSLIPIDEMIA: ICD-10-CM

## 2025-07-23 DIAGNOSIS — M79.89 LEFT LEG SWELLING: Primary | ICD-10-CM

## 2025-07-23 DIAGNOSIS — L23.7 POISON IVY: ICD-10-CM

## 2025-07-23 DIAGNOSIS — Z13.1 SCREENING FOR DIABETES MELLITUS: ICD-10-CM

## 2025-07-23 LAB
ALBUMIN SERPL-MCNC: 4.3 G/DL (ref 3.5–5.2)
ALBUMIN/GLOB SERPL: 1.5 {RATIO} (ref 1–2.5)
ALP SERPL-CCNC: 75 U/L (ref 35–104)
ALT SERPL-CCNC: 18 U/L (ref 10–35)
ANION GAP SERPL CALCULATED.3IONS-SCNC: 13 MMOL/L (ref 9–16)
AST SERPL-CCNC: 22 U/L (ref 10–35)
BASOPHILS # BLD: 0.07 K/UL (ref 0–0.2)
BASOPHILS NFR BLD: 1 % (ref 0–2)
BILIRUB SERPL-MCNC: 0.6 MG/DL (ref 0–1.2)
BUN SERPL-MCNC: 21 MG/DL (ref 8–23)
CALCIUM SERPL-MCNC: 9.9 MG/DL (ref 8.6–10.4)
CHLORIDE SERPL-SCNC: 105 MMOL/L (ref 98–107)
CHOLEST SERPL-MCNC: 273 MG/DL (ref 0–199)
CHOLESTEROL/HDL RATIO: 4
CO2 SERPL-SCNC: 25 MMOL/L (ref 20–31)
CREAT SERPL-MCNC: 0.9 MG/DL (ref 0.6–0.9)
EOSINOPHIL # BLD: 0.29 K/UL (ref 0–0.44)
EOSINOPHILS RELATIVE PERCENT: 4 % (ref 1–4)
ERYTHROCYTE [DISTWIDTH] IN BLOOD BY AUTOMATED COUNT: 13.2 % (ref 11.8–14.4)
GFR, ESTIMATED: 65 ML/MIN/1.73M2
GLUCOSE P FAST SERPL-MCNC: 87 MG/DL (ref 74–99)
HCT VFR BLD AUTO: 40.5 % (ref 36.3–47.1)
HDLC SERPL-MCNC: 69 MG/DL
HGB BLD-MCNC: 12.9 G/DL (ref 11.9–15.1)
IMM GRANULOCYTES # BLD AUTO: <0.03 K/UL (ref 0–0.3)
IMM GRANULOCYTES NFR BLD: 0 %
LDLC SERPL CALC-MCNC: 180 MG/DL (ref 0–100)
LYMPHOCYTES NFR BLD: 1.94 K/UL (ref 1.1–3.7)
LYMPHOCYTES RELATIVE PERCENT: 29 % (ref 24–43)
MCH RBC QN AUTO: 29.9 PG (ref 25.2–33.5)
MCHC RBC AUTO-ENTMCNC: 31.9 G/DL (ref 28.4–34.8)
MCV RBC AUTO: 93.8 FL (ref 82.6–102.9)
MONOCYTES NFR BLD: 0.68 K/UL (ref 0.1–1.2)
MONOCYTES NFR BLD: 10 % (ref 3–12)
NEUTROPHILS NFR BLD: 56 % (ref 36–65)
NEUTS SEG NFR BLD: 3.74 K/UL (ref 1.5–8.1)
NRBC BLD-RTO: 0 PER 100 WBC
PLATELET # BLD AUTO: 243 K/UL (ref 138–453)
PMV BLD AUTO: 11.5 FL (ref 8.1–13.5)
POTASSIUM SERPL-SCNC: 4.4 MMOL/L (ref 3.7–5.3)
PROT SERPL-MCNC: 7.2 G/DL (ref 6.6–8.7)
RBC # BLD AUTO: 4.32 M/UL (ref 3.95–5.11)
SODIUM SERPL-SCNC: 143 MMOL/L (ref 136–145)
TRIGL SERPL-MCNC: 118 MG/DL
VLDLC SERPL CALC-MCNC: 24 MG/DL (ref 1–30)
WBC OTHER # BLD: 6.7 K/UL (ref 3.5–11.3)

## 2025-07-23 RX ORDER — HYDROCODONE BITARTRATE AND ACETAMINOPHEN 5; 325 MG/1; MG/1
1 TABLET ORAL EVERY 8 HOURS PRN
Qty: 7 TABLET | Refills: 0 | Status: SHIPPED | OUTPATIENT
Start: 2025-07-23 | End: 2025-07-26

## 2025-07-23 RX ORDER — METHYLPREDNISOLONE 4 MG/1
TABLET ORAL
Qty: 1 KIT | Refills: 1 | Status: SHIPPED | OUTPATIENT
Start: 2025-07-23 | End: 2025-07-29

## 2025-07-24 ENCOUNTER — RESULTS FOLLOW-UP (OUTPATIENT)
Dept: ORTHOPEDIC SURGERY | Age: 80
End: 2025-07-24

## 2025-07-24 DIAGNOSIS — M79.89 PAIN AND SWELLING OF LEFT LOWER LEG: Primary | ICD-10-CM

## 2025-07-24 DIAGNOSIS — M79.662 PAIN AND SWELLING OF LEFT LOWER LEG: Primary | ICD-10-CM

## 2025-07-24 NOTE — RESULT ENCOUNTER NOTE
Please call the patient and make her aware that she does have fluid collection consistent with a resolving hematoma within the calf but no signs of a muscle. Tendon or ligament tear.     I recommend she starts formal PT and follows up in our office in 4-6 weeks.

## 2025-07-24 NOTE — TELEPHONE ENCOUNTER
Results have been delivered via detailed voicemail message and PT order has been placed. Number was left to call back if needed. [phone].

## 2025-07-30 ENCOUNTER — OFFICE VISIT (OUTPATIENT)
Dept: PRIMARY CARE CLINIC | Age: 80
End: 2025-07-30

## 2025-07-30 ENCOUNTER — TELEPHONE (OUTPATIENT)
Dept: PRIMARY CARE CLINIC | Age: 80
End: 2025-07-30

## 2025-07-30 VITALS
OXYGEN SATURATION: 97 % | BODY MASS INDEX: 30.18 KG/M2 | SYSTOLIC BLOOD PRESSURE: 134 MMHG | HEIGHT: 62 IN | DIASTOLIC BLOOD PRESSURE: 78 MMHG | WEIGHT: 164 LBS | HEART RATE: 96 BPM

## 2025-07-30 DIAGNOSIS — L03.116 CELLULITIS OF LEFT LOWER LEG: ICD-10-CM

## 2025-07-30 DIAGNOSIS — M79.89 LEFT LEG SWELLING: Primary | ICD-10-CM

## 2025-07-30 DIAGNOSIS — R21 RASH: Primary | ICD-10-CM

## 2025-07-30 RX ORDER — CEPHALEXIN 500 MG/1
500 CAPSULE ORAL 2 TIMES DAILY
Qty: 14 CAPSULE | Refills: 0 | Status: SHIPPED | OUTPATIENT
Start: 2025-07-30 | End: 2025-08-06

## 2025-07-30 RX ORDER — PREDNISONE 20 MG/1
20 TABLET ORAL 2 TIMES DAILY
Qty: 10 TABLET | Refills: 0 | Status: SHIPPED | OUTPATIENT
Start: 2025-07-30 | End: 2025-08-04

## 2025-07-30 ASSESSMENT — ENCOUNTER SYMPTOMS
GASTROINTESTINAL NEGATIVE: 1
EYES NEGATIVE: 1
RESPIRATORY NEGATIVE: 1

## 2025-07-30 NOTE — TELEPHONE ENCOUNTER
If the rash has worsened since she saw  again - then she probably should be re-evaluated. (Can recommend [facility] may be closest for her). If the redness is the same as when she saw  - then I think I'd recommend vascular referral. (Would recommend [facility] [address] - external referral needs faxed)

## 2025-07-30 NOTE — TELEPHONE ENCOUNTER
Pt wanted to inform PCP that her leg is still swollen and there is now a \"rash\" that is \"getting bigger\". She was seen in UC, but they \"weren't convinced that it was cellulitis\". Pt\" MRI resulted, stating     \"Please call the patient and make her aware that she does have fluid collection consistent with a resolving hematoma within the calf but no signs of a muscle. Tendon or ligament tear.   I recommend she starts formal PT and follows up in our office in 4-6 weeks.\"    Pt starts PT tomorrow. She would like to know if PCP would like to see her again to evaluate the redness/rash. I explained that there was no availability this week and that pt may need to visit UC again - she understood, but wanted to know what PCP wanted to do.     Please advise.

## 2025-07-30 NOTE — PROGRESS NOTES
[facility]  [address] [address]  Phone: [phone]  Fax: [phone]       [facility] WALK - [place]     Name: Destiney Willams  MRN: [id number]  Birthdate 1945  Date of evaluation: 7/30/2025  Provider: Miracle Beckman PA-C     CHIEF COMPLAINT       Chief Complaint   Patient presents with    Leg Pain     Fell in June, had workup, however now has rash            HISTORY OF PRESENT ILLNESS  (Location/Symptom, Timing/Onset, Context/Setting, Quality, Duration, Modifying Factors, Severity.)   Destiney Willams is a 79 y.o. White (non-) [1] female who presents to the office for evaluation of      Patient presents for evaluation of continued left leg pain and now a rash that is developing.  Patient had a fall back in June and has had multiple workups since then for her left leg pain.  Patient has been treated for cellulitis alongside having scans for blood clots and an MRI which found a hematoma in her left lower leg.  Patient states that she feels that her redness is spreading in the lower leg as well as now developing a rash.  Patient states the rash is itchy.  Referral was placed by her primary care doctor to go see a vascular/leg doctor.  Patient will be starting physical therapy tomorrow.        Nursing Notes were reviewed.    REVIEW OF SYSTEMS    (2-9 systems for level 4, 10 or more for level 5)     Review of Systems   Constitutional:  Negative for chills, diaphoresis, fatigue and fever.   HENT: Negative.     Eyes: Negative.    Respiratory: Negative.     Cardiovascular: Negative.    Gastrointestinal: Negative.    Genitourinary: Negative.    Musculoskeletal:         Left lower leg pain, redness and rash   Skin:  Positive for rash.         Except as noted above the remainder of the review of systems was reviewed andnegative.       PAST MEDICAL HISTORY   History reviewed.    Past Medical History:   Diagnosis Date    Calculus of kidney and ureter 03/27/2019    Cancer of

## 2025-07-31 ENCOUNTER — HOSPITAL ENCOUNTER (OUTPATIENT)
Dept: PHYSICAL THERAPY | Facility: CLINIC | Age: 80
Setting detail: THERAPIES SERIES
Discharge: HOME | End: 2025-07-31
Payer: MEDICARE

## 2025-07-31 PROCEDURE — 97110 THERAPEUTIC EXERCISES: CPT

## 2025-07-31 PROCEDURE — 97016 VASOPNEUMATIC DEVICE THERAPY: CPT

## 2025-07-31 PROCEDURE — 97161 PT EVAL LOW COMPLEX 20 MIN: CPT

## 2025-07-31 NOTE — CONSULTS
Lowest:    2/10    Objective: p = pain, WNL= Within Normal Limits, L= Lacks      ROM  ° A/P STRENGTH    Left Right Left Right   Knee Flex       Ext        Ankle DF 6  15 4/5 5/5   PF 50  65 4/5 5/5   Inversion 15  20 4/5 5/5   Eversion 10  25 4/5 5/5      Special Tests:   Positive:        Negative: Squeeze test, anterior drawer    Functional Tests: LEFI   Score: Raw score of 47/80,   which is 27% functionally impaired    Left SL calf raise: 5 reps  Left SLS: held 10 seconds    [place] Fall Risk Assessment    Risk Factor Scale  Score   History of Falls [x] Yes  [] No 25  0 25   Secondary Diagnosis [] Yes  [x] No 15  0    Ambulatory Aid [] Furniture  [] Crutches/cane/walker  [x] None/bedrest/wheelchair/nurse 30  15  0    IV/Heparin Lock [] Yes  [x] No 20  0    Gait/Transferring [] Impaired  [] Weak  [x] Normal/bedrest/immobile 20  10  0    Mental Status [] Forgets limitations  [x] Oriented to own ability 15  0       Total:25     Based on the Assessment score: check the appropriate box.    []  No intervention needed   Low =   Score of 0-24    [x]  Use standard prevention interventions Moderate =  Score of 24-44   [x] Give patient handout and discuss fall prevention strategies   [x] Establish goal of education for patient/family RE: fall prevention strategies    OBSERVATION Comments   Posture No Deficit    Joint Alignment No Deficit    Gait Short stride, shuffling gait    Palpation Tender throughout shin and calf    Edema Moderate swelling distal to knee/Rash posterior lower leg    Neurological [] No Deficit   [x] Hx of Fibromyalgia       Assessment: Patient demonstrates right lower leg pain, swelling, weakness, and decreased joint ROM due to recent fall, calf injury and subsequent hematoma development. Patient will benefit form physical therapy to improve right ankle ROM, calf strength, balance, and improve ADL function.     Problem list, as detailed above:   [x] ? Pain     [x] ? ROM    [x] ? Strength  [x] ?

## 2025-08-04 ENCOUNTER — TELEPHONE (OUTPATIENT)
Dept: FAMILY MEDICINE CLINIC | Age: 80
End: 2025-08-04

## 2025-08-06 ENCOUNTER — HOSPITAL ENCOUNTER (OUTPATIENT)
Dept: PHYSICAL THERAPY | Facility: CLINIC | Age: 80
Setting detail: THERAPIES SERIES
Discharge: HOME | End: 2025-08-06

## 2025-08-12 ENCOUNTER — HOSPITAL ENCOUNTER (OUTPATIENT)
Dept: PHYSICAL THERAPY | Facility: CLINIC | Age: 80
Setting detail: THERAPIES SERIES
Discharge: HOME | End: 2025-08-12
Payer: MEDICARE

## 2025-08-12 PROCEDURE — 97110 THERAPEUTIC EXERCISES: CPT

## 2025-08-12 PROCEDURE — 97016 VASOPNEUMATIC DEVICE THERAPY: CPT

## 2025-08-20 ENCOUNTER — HOSPITAL ENCOUNTER (OUTPATIENT)
Dept: PHYSICAL THERAPY | Facility: CLINIC | Age: 80
Setting detail: THERAPIES SERIES
Discharge: HOME | End: 2025-08-20
Payer: MEDICARE

## 2025-08-20 PROCEDURE — 97016 VASOPNEUMATIC DEVICE THERAPY: CPT

## 2025-08-20 PROCEDURE — 97110 THERAPEUTIC EXERCISES: CPT

## 2025-08-27 ENCOUNTER — HOSPITAL ENCOUNTER (OUTPATIENT)
Dept: PHYSICAL THERAPY | Facility: CLINIC | Age: 80
Setting detail: THERAPIES SERIES
Discharge: HOME | End: 2025-08-27
Payer: MEDICARE

## 2025-08-27 PROCEDURE — 97110 THERAPEUTIC EXERCISES: CPT

## 2025-08-27 PROCEDURE — 97016 VASOPNEUMATIC DEVICE THERAPY: CPT
